# Patient Record
Sex: MALE | Race: WHITE | Employment: OTHER
[De-identification: names, ages, dates, MRNs, and addresses within clinical notes are randomized per-mention and may not be internally consistent; named-entity substitution may affect disease eponyms.]

---

## 2017-02-27 ENCOUNTER — OFFICE VISIT (OUTPATIENT)
Dept: NEUROSURGERY | Facility: CLINIC | Age: 37
End: 2017-02-27

## 2017-02-27 VITALS
SYSTOLIC BLOOD PRESSURE: 165 MMHG | WEIGHT: 315 LBS | HEART RATE: 84 BPM | BODY MASS INDEX: 41.75 KG/M2 | DIASTOLIC BLOOD PRESSURE: 94 MMHG | HEIGHT: 73 IN

## 2017-02-27 DIAGNOSIS — G56.03 BILATERAL CARPAL TUNNEL SYNDROME: ICD-10-CM

## 2017-02-27 DIAGNOSIS — M50.10 CERVICAL DISC DISORDER WITH RADICULOPATHY OF CERVICAL REGION: ICD-10-CM

## 2017-02-27 DIAGNOSIS — G89.29 NECK PAIN, CHRONIC: ICD-10-CM

## 2017-02-27 DIAGNOSIS — M54.2 NECK PAIN, CHRONIC: ICD-10-CM

## 2017-02-27 DIAGNOSIS — E66.01 MORBID OBESITY DUE TO EXCESS CALORIES (HCC): Primary | ICD-10-CM

## 2017-02-27 PROCEDURE — 99213 OFFICE O/P EST LOW 20 MIN: CPT | Performed by: NEUROLOGICAL SURGERY

## 2017-02-27 RX ORDER — CHOLECALCIFEROL (VITAMIN D3) 125 MCG
500 CAPSULE ORAL DAILY
COMMUNITY

## 2017-02-27 RX ORDER — M-VIT,TX,IRON,MINS/CALC/FOLIC 27MG-0.4MG
1 TABLET ORAL DAILY
COMMUNITY
End: 2017-09-06 | Stop reason: ALTCHOICE

## 2017-03-07 ENCOUNTER — APPOINTMENT (OUTPATIENT)
Dept: GENERAL RADIOLOGY | Age: 37
End: 2017-03-07
Payer: OTHER GOVERNMENT

## 2017-03-07 ENCOUNTER — HOSPITAL ENCOUNTER (EMERGENCY)
Age: 37
Discharge: HOME | End: 2017-03-07
Attending: EMERGENCY MEDICINE
Payer: OTHER GOVERNMENT

## 2017-03-07 VITALS
DIASTOLIC BLOOD PRESSURE: 98 MMHG | TEMPERATURE: 98.4 F | BODY MASS INDEX: 47.5 KG/M2 | WEIGHT: 315 LBS | HEART RATE: 96 BPM | RESPIRATION RATE: 16 BRPM | SYSTOLIC BLOOD PRESSURE: 157 MMHG | OXYGEN SATURATION: 96 %

## 2017-03-07 DIAGNOSIS — H81.10 BENIGN PAROXYSMAL POSITIONAL VERTIGO, UNSPECIFIED LATERALITY: Primary | ICD-10-CM

## 2017-03-07 DIAGNOSIS — R07.9 CHEST PAIN, UNSPECIFIED TYPE: ICD-10-CM

## 2017-03-07 LAB
ABSOLUTE EOS #: 0.2 K/UL (ref 0–0.4)
ABSOLUTE LYMPH #: 1.9 K/UL (ref 1–4.8)
ABSOLUTE MONO #: 0.6 K/UL (ref 0.1–1.2)
ANION GAP SERPL CALCULATED.3IONS-SCNC: 14 MMOL/L (ref 9–17)
BASOPHILS # BLD: 0 % (ref 0–2)
BASOPHILS ABSOLUTE: 0 K/UL (ref 0–0.2)
BUN BLDV-MCNC: 14 MG/DL (ref 6–20)
BUN/CREAT BLD: ABNORMAL (ref 9–20)
CALCIUM SERPL-MCNC: 8.4 MG/DL (ref 8.6–10.4)
CHLORIDE BLD-SCNC: 100 MMOL/L (ref 98–107)
CHP ED QC CHECK: NORMAL
CO2: 23 MMOL/L (ref 20–31)
CREAT SERPL-MCNC: 0.94 MG/DL (ref 0.7–1.2)
DIFFERENTIAL TYPE: ABNORMAL
EOSINOPHILS RELATIVE PERCENT: 2 % (ref 1–4)
GFR AFRICAN AMERICAN: >60 ML/MIN
GFR NON-AFRICAN AMERICAN: >60 ML/MIN
GFR SERPL CREATININE-BSD FRML MDRD: ABNORMAL ML/MIN/{1.73_M2}
GFR SERPL CREATININE-BSD FRML MDRD: ABNORMAL ML/MIN/{1.73_M2}
GLUCOSE BLD-MCNC: 251 MG/DL
GLUCOSE BLD-MCNC: 254 MG/DL (ref 70–99)
HCT VFR BLD CALC: 41.3 % (ref 41–53)
HEMOGLOBIN: 14.3 G/DL (ref 13.5–17.5)
LYMPHOCYTES # BLD: 21 % (ref 24–44)
MCH RBC QN AUTO: 29.1 PG (ref 26–34)
MCHC RBC AUTO-ENTMCNC: 34.5 G/DL (ref 31–37)
MCV RBC AUTO: 84.3 FL (ref 80–100)
MONOCYTES # BLD: 6 % (ref 2–11)
PDW BLD-RTO: 14.8 % (ref 12.5–15.4)
PLATELET # BLD: 196 K/UL (ref 140–450)
PLATELET ESTIMATE: ABNORMAL
PMV BLD AUTO: 8.9 FL (ref 6–12)
POC TROPONIN I: 0 NG/ML (ref 0–0.1)
POC TROPONIN I: 0 NG/ML (ref 0–0.1)
POC TROPONIN INTERP: NORMAL
POC TROPONIN INTERP: NORMAL
POTASSIUM SERPL-SCNC: 4 MMOL/L (ref 3.7–5.3)
RBC # BLD: 4.9 M/UL (ref 4.5–5.9)
RBC # BLD: ABNORMAL 10*6/UL
SEG NEUTROPHILS: 71 % (ref 36–66)
SEGMENTED NEUTROPHILS ABSOLUTE COUNT: 6.4 K/UL (ref 1.8–7.7)
SODIUM BLD-SCNC: 137 MMOL/L (ref 135–144)
TSH SERPL DL<=0.05 MIU/L-ACNC: 3.03 MIU/L (ref 0.3–5)
WBC # BLD: 9 K/UL (ref 3.5–11)
WBC # BLD: ABNORMAL 10*3/UL

## 2017-03-07 PROCEDURE — 84443 ASSAY THYROID STIM HORMONE: CPT

## 2017-03-07 PROCEDURE — 80048 BASIC METABOLIC PNL TOTAL CA: CPT

## 2017-03-07 PROCEDURE — 85025 COMPLETE CBC W/AUTO DIFF WBC: CPT

## 2017-03-07 PROCEDURE — 93005 ELECTROCARDIOGRAM TRACING: CPT

## 2017-03-07 PROCEDURE — 6370000000 HC RX 637 (ALT 250 FOR IP): Performed by: EMERGENCY MEDICINE

## 2017-03-07 PROCEDURE — 99285 EMERGENCY DEPT VISIT HI MDM: CPT

## 2017-03-07 PROCEDURE — 71020 XR CHEST STANDARD TWO VW: CPT

## 2017-03-07 PROCEDURE — 2580000003 HC RX 258: Performed by: EMERGENCY MEDICINE

## 2017-03-07 PROCEDURE — 84484 ASSAY OF TROPONIN QUANT: CPT

## 2017-03-07 RX ORDER — ASPIRIN 81 MG/1
324 TABLET, CHEWABLE ORAL ONCE
Status: COMPLETED | OUTPATIENT
Start: 2017-03-07 | End: 2017-03-07

## 2017-03-07 RX ORDER — MECLIZINE HYDROCHLORIDE 25 MG/1
25 TABLET ORAL 3 TIMES DAILY PRN
Qty: 15 TABLET | Refills: 0 | Status: SHIPPED | OUTPATIENT
Start: 2017-03-07 | End: 2017-04-11 | Stop reason: DRUGHIGH

## 2017-03-07 RX ORDER — 0.9 % SODIUM CHLORIDE 0.9 %
1000 INTRAVENOUS SOLUTION INTRAVENOUS ONCE
Status: COMPLETED | OUTPATIENT
Start: 2017-03-07 | End: 2017-03-07

## 2017-03-07 RX ORDER — MECLIZINE HCL 12.5 MG/1
25 TABLET ORAL ONCE
Status: COMPLETED | OUTPATIENT
Start: 2017-03-07 | End: 2017-03-07

## 2017-03-07 RX ADMIN — ASPIRIN 81 MG 324 MG: 81 TABLET ORAL at 15:00

## 2017-03-07 RX ADMIN — SODIUM CHLORIDE 1000 ML: 9 INJECTION, SOLUTION INTRAVENOUS at 15:00

## 2017-03-07 RX ADMIN — MECLIZINE HCL 25 MG: 12.5 TABLET ORAL at 15:12

## 2017-03-07 ASSESSMENT — ENCOUNTER SYMPTOMS
DIARRHEA: 0
VOMITING: 0
BACK PAIN: 0
NAUSEA: 1
CONSTIPATION: 0
RHINORRHEA: 0
TROUBLE SWALLOWING: 0
ABDOMINAL PAIN: 0
SHORTNESS OF BREATH: 1

## 2017-03-07 ASSESSMENT — PAIN DESCRIPTION - PAIN TYPE: TYPE: ACUTE PAIN

## 2017-03-07 ASSESSMENT — PAIN DESCRIPTION - LOCATION: LOCATION: CHEST

## 2017-03-08 LAB
EKG ATRIAL RATE: 89 BPM
EKG P AXIS: 29 DEGREES
EKG P-R INTERVAL: 164 MS
EKG Q-T INTERVAL: 344 MS
EKG QRS DURATION: 82 MS
EKG QTC CALCULATION (BAZETT): 418 MS
EKG R AXIS: 0 DEGREES
EKG T AXIS: 33 DEGREES
EKG VENTRICULAR RATE: 89 BPM

## 2017-04-11 ENCOUNTER — OFFICE VISIT (OUTPATIENT)
Dept: FAMILY MEDICINE CLINIC | Age: 37
End: 2017-04-11
Payer: OTHER GOVERNMENT

## 2017-04-11 VITALS
RESPIRATION RATE: 16 BRPM | OXYGEN SATURATION: 97 % | BODY MASS INDEX: 41.75 KG/M2 | TEMPERATURE: 99 F | HEIGHT: 73 IN | DIASTOLIC BLOOD PRESSURE: 92 MMHG | SYSTOLIC BLOOD PRESSURE: 112 MMHG | HEART RATE: 114 BPM | WEIGHT: 315 LBS

## 2017-04-11 DIAGNOSIS — R06.02 SHORTNESS OF BREATH: ICD-10-CM

## 2017-04-11 DIAGNOSIS — B37.9 YEAST INFECTION: ICD-10-CM

## 2017-04-11 DIAGNOSIS — E07.9 THYROID DISEASE: ICD-10-CM

## 2017-04-11 DIAGNOSIS — E11.8 TYPE 2 DIABETES MELLITUS WITH COMPLICATION, WITHOUT LONG-TERM CURRENT USE OF INSULIN (HCC): Primary | ICD-10-CM

## 2017-04-11 DIAGNOSIS — N39.490 OVERFLOW INCONTINENCE: ICD-10-CM

## 2017-04-11 LAB
-: ABNORMAL
AMORPHOUS: ABNORMAL
BACTERIA: ABNORMAL
CASTS UA: ABNORMAL /LPF (ref 0–2)
CRYSTALS, UA: ABNORMAL /HPF
EPITHELIAL CELLS UA: ABNORMAL /HPF (ref 0–5)
GLUCOSE BLD-MCNC: 302 MG/DL
MUCUS: ABNORMAL
OTHER OBSERVATIONS UA: ABNORMAL
RBC UA: ABNORMAL /HPF (ref 0–4)
RENAL EPITHELIAL, UA: ABNORMAL /HPF
TRICHOMONAS: ABNORMAL
WBC UA: ABNORMAL /HPF (ref 0–4)
YEAST: ABNORMAL

## 2017-04-11 PROCEDURE — 99214 OFFICE O/P EST MOD 30 MIN: CPT | Performed by: FAMILY MEDICINE

## 2017-04-11 PROCEDURE — 93000 ELECTROCARDIOGRAM COMPLETE: CPT | Performed by: FAMILY MEDICINE

## 2017-04-11 PROCEDURE — 82962 GLUCOSE BLOOD TEST: CPT | Performed by: FAMILY MEDICINE

## 2017-04-11 RX ORDER — GLIMEPIRIDE 2 MG/1
2 TABLET ORAL EVERY MORNING
Qty: 30 TABLET | Refills: 3 | Status: SHIPPED | OUTPATIENT
Start: 2017-04-11 | End: 2017-04-11 | Stop reason: SDUPTHER

## 2017-04-11 RX ORDER — HYDROCODONE BITARTRATE AND ACETAMINOPHEN 5; 325 MG/1; MG/1
1 TABLET ORAL EVERY 4 HOURS PRN
Refills: 0 | COMMUNITY
Start: 2017-04-05 | End: 2017-04-11

## 2017-04-11 RX ORDER — LEVOTHYROXINE SODIUM 0.1 MG/1
0.5 TABLET ORAL DAILY
COMMUNITY

## 2017-04-11 RX ORDER — GLIMEPIRIDE 2 MG/1
2 TABLET ORAL EVERY MORNING
Qty: 30 TABLET | Refills: 3 | Status: SHIPPED | OUTPATIENT
Start: 2017-04-11 | End: 2017-05-11 | Stop reason: ALTCHOICE

## 2017-04-11 RX ORDER — NYSTATIN 100000 U/G
OINTMENT TOPICAL
Qty: 30 G | Refills: 0 | Status: SHIPPED | OUTPATIENT
Start: 2017-04-11 | End: 2017-05-11 | Stop reason: ALTCHOICE

## 2017-04-11 ASSESSMENT — ENCOUNTER SYMPTOMS
WHEEZING: 0
SHORTNESS OF BREATH: 1
CHEST TIGHTNESS: 0
ABDOMINAL PAIN: 0
NAUSEA: 1
COUGH: 0

## 2017-04-12 ENCOUNTER — TELEPHONE (OUTPATIENT)
Dept: FAMILY MEDICINE CLINIC | Age: 37
End: 2017-04-12

## 2017-04-20 ENCOUNTER — OFFICE VISIT (OUTPATIENT)
Dept: NEUROLOGY | Age: 37
End: 2017-04-20
Payer: OTHER GOVERNMENT

## 2017-04-20 VITALS
HEIGHT: 73 IN | HEART RATE: 70 BPM | DIASTOLIC BLOOD PRESSURE: 82 MMHG | BODY MASS INDEX: 41.75 KG/M2 | WEIGHT: 315 LBS | SYSTOLIC BLOOD PRESSURE: 124 MMHG

## 2017-04-20 DIAGNOSIS — R41.0 CONFUSION: ICD-10-CM

## 2017-04-20 DIAGNOSIS — F41.9 ANXIETY: ICD-10-CM

## 2017-04-20 DIAGNOSIS — M19.90 ARTHRITIS: ICD-10-CM

## 2017-04-20 DIAGNOSIS — G47.9 SLEEP DIFFICULTIES: ICD-10-CM

## 2017-04-20 DIAGNOSIS — E66.01 MORBID OBESITY DUE TO EXCESS CALORIES (HCC): ICD-10-CM

## 2017-04-20 DIAGNOSIS — R26.9 GAIT DIFFICULTY: ICD-10-CM

## 2017-04-20 DIAGNOSIS — S06.9X9S TBI (TRAUMATIC BRAIN INJURY), WITH LOSS OF CONSCIOUSNESS OF UNSPECIFIED DURATION, SEQUELA: ICD-10-CM

## 2017-04-20 DIAGNOSIS — G44.309 POST-TRAUMATIC HEADACHE, NOT INTRACTABLE, UNSPECIFIED CHRONICITY PATTERN: ICD-10-CM

## 2017-04-20 DIAGNOSIS — E11.8 TYPE 2 DIABETES MELLITUS WITH COMPLICATION, WITHOUT LONG-TERM CURRENT USE OF INSULIN (HCC): ICD-10-CM

## 2017-04-20 DIAGNOSIS — G56.01 CARPAL TUNNEL SYNDROME, RIGHT: ICD-10-CM

## 2017-04-20 DIAGNOSIS — K21.9 GASTROESOPHAGEAL REFLUX DISEASE WITHOUT ESOPHAGITIS: ICD-10-CM

## 2017-04-20 DIAGNOSIS — R26.89 BALANCE PROBLEM: ICD-10-CM

## 2017-04-20 DIAGNOSIS — G62.9 PERIPHERAL POLYNEUROPATHY: ICD-10-CM

## 2017-04-20 DIAGNOSIS — G89.29 CHRONIC PAIN OF LEFT ANKLE: ICD-10-CM

## 2017-04-20 DIAGNOSIS — G47.30 SLEEP APNEA, UNSPECIFIED TYPE: ICD-10-CM

## 2017-04-20 DIAGNOSIS — F32.A DEPRESSION, UNSPECIFIED DEPRESSION TYPE: ICD-10-CM

## 2017-04-20 DIAGNOSIS — G40.909 SEIZURE DISORDER (HCC): Primary | ICD-10-CM

## 2017-04-20 DIAGNOSIS — E07.9 THYROID DISEASE: ICD-10-CM

## 2017-04-20 DIAGNOSIS — M25.572 CHRONIC PAIN OF LEFT ANKLE: ICD-10-CM

## 2017-04-20 DIAGNOSIS — R56.9 CONVULSIONS, UNSPECIFIED CONVULSION TYPE (HCC): ICD-10-CM

## 2017-04-20 DIAGNOSIS — R41.3 MEMORY PROBLEM: ICD-10-CM

## 2017-04-20 DIAGNOSIS — Z87.828 OLD HEAD INJURY: ICD-10-CM

## 2017-04-20 PROCEDURE — 99215 OFFICE O/P EST HI 40 MIN: CPT | Performed by: PSYCHIATRY & NEUROLOGY

## 2017-04-20 ASSESSMENT — ENCOUNTER SYMPTOMS
APNEA: 0
BLOOD IN STOOL: 0
ABDOMINAL DISTENTION: 0
DIARRHEA: 0
EYE REDNESS: 0
VOICE CHANGE: 0
TROUBLE SWALLOWING: 0
CHEST TIGHTNESS: 0
WHEEZING: 0
EYE ITCHING: 0
SHORTNESS OF BREATH: 0
CONSTIPATION: 0
CHOKING: 0
EYE DISCHARGE: 0
SINUS PRESSURE: 0
EYE PAIN: 0
FACIAL SWELLING: 0
PHOTOPHOBIA: 0
BACK PAIN: 1
COLOR CHANGE: 0

## 2017-04-25 ENCOUNTER — TELEPHONE (OUTPATIENT)
Dept: FAMILY MEDICINE CLINIC | Age: 37
End: 2017-04-25

## 2017-05-08 ENCOUNTER — TELEPHONE (OUTPATIENT)
Dept: FAMILY MEDICINE CLINIC | Age: 37
End: 2017-05-08

## 2017-05-11 ENCOUNTER — OFFICE VISIT (OUTPATIENT)
Dept: FAMILY MEDICINE CLINIC | Age: 37
End: 2017-05-11
Payer: OTHER GOVERNMENT

## 2017-05-11 VITALS
WEIGHT: 315 LBS | TEMPERATURE: 97.6 F | BODY MASS INDEX: 41.75 KG/M2 | HEIGHT: 73 IN | SYSTOLIC BLOOD PRESSURE: 130 MMHG | HEART RATE: 84 BPM | DIASTOLIC BLOOD PRESSURE: 82 MMHG | OXYGEN SATURATION: 98 %

## 2017-05-11 DIAGNOSIS — E66.01 MORBID OBESITY DUE TO EXCESS CALORIES (HCC): ICD-10-CM

## 2017-05-11 DIAGNOSIS — E11.9 TYPE 2 DIABETES MELLITUS WITHOUT COMPLICATION, WITHOUT LONG-TERM CURRENT USE OF INSULIN (HCC): Primary | ICD-10-CM

## 2017-05-11 PROCEDURE — 99213 OFFICE O/P EST LOW 20 MIN: CPT | Performed by: FAMILY MEDICINE

## 2017-05-11 RX ORDER — GLIPIZIDE 5 MG/1
5 TABLET ORAL
COMMUNITY
End: 2017-09-06 | Stop reason: ALTCHOICE

## 2017-05-11 ASSESSMENT — ENCOUNTER SYMPTOMS
CHEST TIGHTNESS: 0
SHORTNESS OF BREATH: 0
WHEEZING: 0
COUGH: 0

## 2017-05-17 ENCOUNTER — HOSPITAL ENCOUNTER (OUTPATIENT)
Age: 37
Setting detail: SPECIMEN
Discharge: HOME | End: 2017-05-17
Payer: COMMERCIAL

## 2017-09-06 ENCOUNTER — OFFICE VISIT (OUTPATIENT)
Dept: PODIATRY | Age: 37
End: 2017-09-06
Payer: OTHER GOVERNMENT

## 2017-09-06 VITALS
DIASTOLIC BLOOD PRESSURE: 78 MMHG | SYSTOLIC BLOOD PRESSURE: 124 MMHG | WEIGHT: 315 LBS | HEART RATE: 84 BPM | RESPIRATION RATE: 20 BRPM | HEIGHT: 73 IN | BODY MASS INDEX: 41.75 KG/M2

## 2017-09-06 DIAGNOSIS — G89.29 CHRONIC PAIN OF LEFT ANKLE: ICD-10-CM

## 2017-09-06 DIAGNOSIS — M21.6X2 PRONATION OF BOTH FEET: ICD-10-CM

## 2017-09-06 DIAGNOSIS — M25.572 CHRONIC PAIN OF LEFT ANKLE: ICD-10-CM

## 2017-09-06 DIAGNOSIS — E11.49 DM TYPE 2 CAUSING NEUROLOGICAL DISEASE (HCC): Primary | ICD-10-CM

## 2017-09-06 DIAGNOSIS — M21.6X1 PRONATION OF BOTH FEET: ICD-10-CM

## 2017-09-06 PROCEDURE — 99213 OFFICE O/P EST LOW 20 MIN: CPT | Performed by: PODIATRIST

## 2022-07-15 ENCOUNTER — OFFICE VISIT (OUTPATIENT)
Dept: NEUROSURGERY | Age: 42
End: 2022-07-15
Payer: OTHER GOVERNMENT

## 2022-07-15 VITALS
HEIGHT: 73 IN | DIASTOLIC BLOOD PRESSURE: 72 MMHG | WEIGHT: 308 LBS | SYSTOLIC BLOOD PRESSURE: 118 MMHG | HEART RATE: 94 BPM | BODY MASS INDEX: 40.82 KG/M2

## 2022-07-15 DIAGNOSIS — M47.818 ARTHROPATHY OF LEFT SACROILIAC JOINT: Primary | ICD-10-CM

## 2022-07-15 PROCEDURE — 99204 OFFICE O/P NEW MOD 45 MIN: CPT | Performed by: NEUROLOGICAL SURGERY

## 2022-07-15 RX ORDER — NORTRIPTYLINE HYDROCHLORIDE 25 MG/1
25 CAPSULE ORAL
COMMUNITY
Start: 2022-05-19

## 2022-07-15 NOTE — PROGRESS NOTES
[place] [address] [facility]  [address] [facility]  [address] [facility], [address]  [place] [address] [place] [address]  Dept: [phone]    Patient:  Toby Durán  YOB: 1980  Date: 7/15/22    The patient is a 43 y.o. male who presents today for consult of the following problems:     Chief Complaint   Patient presents with    Back Pain     Low back pain, mri done in April 2022 at [address] [facility]             HPI:     Toby Durán is a 43 y.o. male on whom neurosurgical consultation was requested by No primary care provider on file. for management of left-sided paraspinal and left lower flank pain that radiates along the lateral aspect of the flank into the groin and the medial aspect of the left thigh terminating at the knee on the medial region. Denies any significant pain below the knee into the calf region shin or into the foot. Denies any pain directly over the hip joint. Some correlation with change in position as well as ambulating and weightbearing on the left side. No significant correlating symptoms on the right. Has had ongoing axial pain for a number of years as he is a  and has undergone significant months of trauma. The pain radiating into the left lower extremity along the flank has been more recent just as of February without any specific inciting event. He specifically states that he awoke with the pain 1 day and did not recall any type of inciting event that occurred prior to this. Has been through chiropractic interventions and dry needling which actually made him somewhat worse. Has not had any previous steroid injections thus far. No medications for pain and using ice currently.       History:     Past Medical History:   Diagnosis Date    Anesthesia     trouble coming out of anesthesia and with morphine    Arthritis     Carpal tunnel syndrome, right     Diabetes mellitus (HCC)     Dizziness     GERD Review of Systems  ROS: back and leg pain    Physical Exam:      /72   Pulse 94   Ht 6' 1\" (1.854 m)   BMI 48.42 kg/m²   Estimated body mass index is 48.42 kg/m² as calculated from the following:    Height as of this encounter: 6' 1\" (1.854 m). Weight as of 9/6/17: 367 lb (166.5 kg). General:  Urszula Ortiz is a 43y.o. year old male who appears his stated age. HEENT: Normocephalic atraumatic. Neck supple. Chest: regular rate; pulses equal. Equal chest rise and fall  Abdomen: Soft nondistended. Ext: DP equal with good capillary refill  Neuro    Mentation  Appropriate affect   oriented    Cranial Nerves:   Pupils equal and reactive to light  Extraocular motion intact  Face symmetric  No dysarthria  v1-3 sensation symmetric, masseter tone symmetric  Hearing symmetric and intact to finger rub    Sensation:   intact    Motor  L deltoid 5/5; R deltoid 5/5  L biceps 5/5; R biceps 5/5  L triceps 5/5; R triceps 5/5  L wrist extension 5/5; R wrist extension 5/5  L intrinsics 5/5; R intrinsics 5/5     L iliopsoas 5/5 , R iliopsoas 5/5  L quadriceps 5/5; R quadriceps 5/5  L Dorsiflexion 5/5; R dorsiflexion 5/5  L Plantarflexion 5/5; R plantarflexion 5/5  L EHL 5/5; R EHL 5/5    Reflexes  L Brachioradialis 2+/4; R brachioradialis 2+/4  L Biceps 2+/4; R Biceps 2+/4  L Triceps 2+/4; R Triceps 2+/4  L Patellar 2+/4: R Patellar 2+/4  L Achilles 2+/4; R Achilles 2+/4    hoffmans L: neg  hoffmans R: neg  Clonus L: neg  Clonus R: neg  Babinski L: up  Babinski R; up    +pain with external rotation. +TTP over L SIJ  Neg tenderness over greater trochanters. Neg straight leg raise    Studies Review:     MRI lumbar spine with left-sided paracentral disc protrusion at L4-5 along with disc desiccation. Overall maintained lordosis with mild multilevel spondylosis. Assessment and Plan:      1.  Arthropathy of left sacroiliac joint          Plan: Clinically appears that the patient's pattern does not seem consistent necessarily with an L5 radiculopathy. He does have focal tenderness over the left SI joint and pain appears to radiate along the flank into the groin. This leads me to believe that this very well may be a focal sacroiliitis that has caused extreme symptoms related to gait changes. Would recommend a diagnostic block of the left SI joint followed by ablation if successful. If this is unsuccessful would consider  L5 either transforaminal or an L4-5 interlaminar. Followup: No follow-ups on file. Prescriptions Ordered:  Orders Placed This Encounter   Medications    diclofenac sodium (VOLTAREN) 1 % GEL     Sig: Apply 4 g topically 4 times daily as needed for Pain     Dispense:  100 g     Refill:  2        Orders Placed:  No orders of the defined types were placed in this encounter. Electronically signed by Leigh Deng DO on 7/15/2022 at 12:06 PM    Please note that this chart was generated using voice recognition Dragon dictation software. Although every effort was made to ensure the accuracy of this automated transcription, some errors in transcription may have occurred.

## 2022-08-04 ENCOUNTER — TELEPHONE (OUTPATIENT)
Dept: PAIN MANAGEMENT | Age: 42
End: 2022-08-04

## 2022-08-04 NOTE — TELEPHONE ENCOUNTER
Dr. Carmen Lara sent me  message  while I was away  to perform   Left S-I joint injection. Please  schedule  new OV  and let patient  know that Dr. Carmen Lara asked for me  to  see him.  Thank You

## 2022-08-15 ENCOUNTER — OFFICE VISIT (OUTPATIENT)
Dept: PAIN MANAGEMENT | Age: 42
End: 2022-08-15
Payer: OTHER GOVERNMENT

## 2022-08-15 ENCOUNTER — TELEPHONE (OUTPATIENT)
Dept: PAIN MANAGEMENT | Age: 42
End: 2022-08-15

## 2022-08-15 VITALS
RESPIRATION RATE: 17 BRPM | BODY MASS INDEX: 39.63 KG/M2 | DIASTOLIC BLOOD PRESSURE: 84 MMHG | SYSTOLIC BLOOD PRESSURE: 120 MMHG | OXYGEN SATURATION: 96 % | HEART RATE: 93 BPM | HEIGHT: 73 IN | WEIGHT: 299 LBS

## 2022-08-15 DIAGNOSIS — M46.1 SACROILIITIS (HCC): Primary | ICD-10-CM

## 2022-08-15 DIAGNOSIS — M51.26 PROTRUSION OF LUMBAR INTERVERTEBRAL DISC: ICD-10-CM

## 2022-08-15 PROCEDURE — 99215 OFFICE O/P EST HI 40 MIN: CPT | Performed by: PHYSICAL MEDICINE & REHABILITATION

## 2022-08-15 PROCEDURE — 99204 OFFICE O/P NEW MOD 45 MIN: CPT | Performed by: PHYSICAL MEDICINE & REHABILITATION

## 2022-08-15 ASSESSMENT — ENCOUNTER SYMPTOMS
RESPIRATORY NEGATIVE: 1
ALLERGIC/IMMUNOLOGIC NEGATIVE: 1
NAUSEA: 0
CONSTIPATION: 0
EYES NEGATIVE: 1
BACK PAIN: 1

## 2022-08-15 NOTE — PATIENT INSTRUCTIONS
[facility]  [address]  Telephone [phone]  Fax [phone]      PROCEDURE INSTRUCTIONS FOR  PAIN MANAGEMENT PROCEDURES WITHOUT IV SEDATION    Nelda Torres scheduled to see Dr. Ifeanyi Herrera to undergo the following procedure:  Left Sacroliliac Joint Injection    Procedure Date: ____8/26/22____  You will receive a phone call the day prior to your procedure to confirm a time of arrival.    Report to the [place] [id number], Registration office on the 1st floor in the hospital, after check in and signing of paper work you will then go to the second floor to the surgery center. 1. Stop the following medications prior to the procedure:    none  Stop blood thinners as directed before the injection, with permission from your cardiologist or primary care physician. We will send a letter to them requesting permission to hold the blood thinners. If you take Warfarin (Coumadin), you must have your blood drawn for an INR the day before the procedure. INR must be less than 1.5. if not complete prior to check in the procedure this will be drawn at the procedure center prior to having the procedure completed. 2.  Take all routine medications unless otherwise instructed. Ok to take vitamins and antiinflammatory medications    3. EATING & DRINKING:  Ok to eat or drink before the injection - no IV sedation will be used. 4. If you are allergic to contrast or iodine, you must take benadryl and prednisone prior to the injection to prevent an allergic reaction. Follow the directions on the prescription for the times to take the medication. 5. Oral valium can be prescribed if your are anxious about the injections or feel that you can not lay still during the injection. If you take valium, you must have a . Make arrangements for a family member or friend to drive you to the surgery center.   Your ride must stay in the hospital while you are having the injection done. If they cannot stay, the injection will be rescheduled. The valium may affect your judgment following the procedure and driving a vehicle within 24 hours after the sedation could be dangerous. 6.  Wear simple loose clothing, which can be easily changed. 7.  Leave jewelry (including rings) and other valuables at home. 8.  You will be asked to sign several forms prior to surgery; patients under the age of 25 must have a parent or legal guardian sign the permit to be able to do the procedure. 9.  You must have finished any antibiotic prescribed for recent infections. If required, please take pre-procedure antibiotic or other pre-procedure medications as instructed. 10. Bring inhalers and pain medications with you to your procedure. 11. Bring your MRI/CT films if they were done outside of the [facility]. 12. If you should develop a cold, sore throat, cough, fever or other new indication of illness or infection, or are started on antibiotics within 2 weeks of the scheduled procedure, please notify the [facility] office as early as possible at [phone]. If calling after 4:30pm the day prior to your scheduled procedure please contact [phone] and Leave a Voice Message.

## 2022-08-15 NOTE — PROGRESS NOTES
PAIN MANAGEMENTNEW PATIENT CONSULTATION  8/15/22    Adonica Paz  1980    ReferringProvider:  Estelle Faust MD  [id number],  [address] [place]    Primary Care Physician:  No primary care provider on file. No primary provider on file. HPIinformation obtained from the patient as well as the Comprehensive Pain ManagementHealth Questionnaire filled out by the patient. The questionnaire will be scannedinto the electronic chart and PMH, PSH, meds, and allergies will be updates accordingly. Subjective:       CHIEF COMPLAINT:  This is a37 y.o. male patientwho presents with a complaint of Back Pain (Lower ) and Hip Pain (Both )      PAIN HPI:    Pain in L lumbar  radiates to posterior lateral L leg to knee and  also along groin into medial  leg Has  point pain L mid gluteal     Location: Back L eft   Location Modifier: Left  Severity of Pain: 5  Duration of Pain: Intermittent  Frequency of Pain: Intermittent  Aggravating Factors: Stretching, Bending, Straightening, Standing, Walking, Stairs, Exercise, Kneeling, and Squatting  Limiting Behavior: no  Relieving Factors: Heat, Cold, and Medication -  Result of Injury: no  Work Related Injury: no  [place] of Worker Compensation Case: no      Prior evaluation:    Previous lower back problems:No    Previous workup: No   History of surgery in painful area:No    Previous pain medication trials have included:       Opioids: -     NSAID's:-     Muscle relaxants: -     Neuropathicpain meds: -    Previous Pain Management Physician: No   Nerve blocks, spinal injections:No   Typeof Pain Intervention -. Date of last Pain Intervention  .-   Was there pain relief from Pain Intervention: No.    How long was your pain relief from the Pain Intervention .-    Sleep:       Difficultyfalling asleep:  No   Takes sleepingmedication: No    Mental health:    Patient feels - secondary to their current pain problems as described above.    H/O depression and anxiety: No   Patient is not seeing psychologist orpsychiatrist   Abuse history? .    Employed? No  Alcohol use?: No  Tobacco use?: No  Marijuana use?: No  Illicit drug use?: No    Imaging: Reviewed available imagingin our system with the patient. No results found. Referring physician records reviewed. Review of Systems   Constitutional:  Positive for fatigue. HENT: Negative. Eyes: Negative. Respiratory: Negative. Cardiovascular: Negative. Gastrointestinal:  Negative for constipation and nausea. Endocrine: Negative. Genitourinary:  Negative for difficulty urinating. Musculoskeletal:  Positive for arthralgias, back pain and myalgias. Skin: Negative. Allergic/Immunologic: Negative. Neurological:  Positive for weakness and numbness. Hematological: Negative. Psychiatric/Behavioral:  Positive for sleep disturbance. All other systems reviewed and are negative. Allergies   Allergen Reactions    Advil [Ibuprofen]     Diclofenac Other (See Comments) and Diarrhea     Other reaction(s): Abdominal pain, Diarrhea, Diarrhea, Abdominal pain, pain  Abdominal pains and muscle spasms         Outpatient Medications Prior to Visit   Medication Sig Dispense Refill    nortriptyline (PAMELOR) 25 MG capsule Take 25 mg by mouth      diclofenac sodium (VOLTAREN) 1 % GEL Apply 4 g topically 4 times daily as needed for Pain 100 g 2    levothyroxine (SYNTHROID) 100 MCG tablet Take 0.5 tablets by mouth daily      vitamin B-12 (CYANOCOBALAMIN) 500 MCG tablet Take 500 mcg by mouth daily      vitamin D (ERGOCALCIFEROL) 68193 UNITS CAPS capsule Take 50,000 Units by mouth once a week       No facility-administered medications prior to visit.        Past Medical History:   Diagnosis Date    Anesthesia     trouble coming out of anesthesia and with morphine    Arthritis     Carpal tunnel syndrome, right     Diabetes mellitus (HCC)     Dizziness     GERD (gastroesophageal reflux disease)     no RX    Headache     IBS (irritable bowel syndrome)     PTSD (post-traumatic stress disorder)     [employer]     Shortness of breath     Sleep apnea     cpap    Thyroid disease     Wrist pain, right        Past Surgical History:   Procedure Laterality Date    CARPAL TUNNEL RELEASE Right 10/20/2016    CYST REMOVAL Left 2009    testicle    FOOT SURGERY  2007    SHOULDER SURGERY Right 04/2017    [facility] / [facility]    TESTICLE REMOVAL Left 2010    VASECTOMY  2010       Family History   Problem Relation Age of Onset    Heart Disease Father     High Blood Pressure Father     Stroke Father     Bleeding Prob Father     Kidney Disease Father     Thyroid Disease Father     Alcohol Abuse Father     Other Father         respiratory illness    Diabetes Maternal Grandmother     Diabetes Maternal Grandfather     Breast Cancer Paternal Grandmother     Diabetes Paternal Grandmother     Diabetes Paternal Grandfather     Glaucoma Other      Social History     Socioeconomic History    Marital status:      Spouse name: None    Number of children: None    Years of education: None    Highest education level: None   Tobacco Use    Smoking status: Former     Years: 7.00     Types: Cigarettes    Smokeless tobacco: Never   Substance and Sexual Activity    Alcohol use: No    Drug use: Yes     Types: Marijuana (Weed)    Sexual activity: Yes     Partners: Female         Objective:     Physical Exam:  Vitals:    08/15/22 1418   BP: 120/84   Pulse: 93   Resp: 17   SpO2: 96%   Weight: 299 lb (135.6 kg)   Height: 6' 1\" (1.854 m)          Physical Exam  Constitutional:       Appearance: He is well-developed. HENT:      Head: Normocephalic and atraumatic. Cardiovascular:      Pulses: Normal pulses. Comments: Warm extremities. Normal capillary refill. Pulmonary:      Effort: Pulmonary effort is normal.   Abdominal:      General: Abdomen is flat. Palpations: Abdomen is soft. Skin:     General: Skin is warm and dry.    Neurological:      Mental Status: He is alert and oriented to person, place, and time. Cranial Nerves: No cranial nerve deficit. Sensory: No sensory deficit. Motor: No atrophy or abnormal muscle tone. Deep Tendon Reflexes: Reflexes are normal and symmetric. Psychiatric:         Speech: Speech normal.         Behavior: Behavior normal.       Back Exam     Tenderness   The patient is experiencing tenderness in the lumbar (+ fabers  ? ++SLR+Slump  ?). Labs:   Lab Results   Component Value Date    WBC 9.0 03/07/2017    HGB 14.3 03/07/2017    HCT 41.3 03/07/2017     03/07/2017     (H) 04/11/2017    AST 85 (H) 04/11/2017     04/11/2017    K 4.0 04/11/2017    CL 96 (L) 04/11/2017    CREATININE 0.89 04/11/2017    BUN 13 04/11/2017    CO2 24 04/11/2017    TSH 3.02 04/11/2017    LABA1C 9.4 (H) 04/11/2017    LABMICR CANNOT BE CALCULATED 05/11/2017       Assessment: This is a 43 y.o. male with the following diagnosis:     Pain Diagnoses:  1. Sacroiliitis ([facility] [address]    2. Protrusion of lumbar intervertebral disc        Medical/ Psychological Comorbidities:  As listed in the past medical and surgical history    Functional Limitations secondary to the above problems:  Chronic painlimits function and quality of life    Plan:   L S-I injection and follow      Meds:   New Prescriptions    No medications on file      No orders of the defined types were placed in this encounter. Controlled Substances Monitoring:    [place]AR[place] report was reviewed for [place], [place]. Pt educated about the risks of taking opiates, including increasedsedation, constipation, slowed breathing, tolerance, dependence, and addiction. No orders of the defined types were placed in this encounter. No follow-ups on file. The patient expressed understanding of the above assessment and plan.     Totaltime spent face to face with patient was 25 minutes inwhich  50% or more of the time was spent in counseling, education about risk andbenefits of the above plan, and coordination of care.

## 2022-08-15 NOTE — TELEPHONE ENCOUNTER
Left SIJ Injection  with no sedation scheduled for 8/26/22 with surgery center notified. Mayuri Dobbs

## 2022-08-26 ENCOUNTER — HOSPITAL ENCOUNTER (OUTPATIENT)
Age: 42
Setting detail: OUTPATIENT SURGERY
Discharge: HOME | End: 2022-08-26
Attending: PHYSICAL MEDICINE & REHABILITATION | Admitting: PHYSICAL MEDICINE & REHABILITATION
Payer: OTHER GOVERNMENT

## 2022-08-26 ENCOUNTER — APPOINTMENT (OUTPATIENT)
Dept: GENERAL RADIOLOGY | Age: 42
End: 2022-08-26
Attending: PHYSICAL MEDICINE & REHABILITATION
Payer: OTHER GOVERNMENT

## 2022-08-26 VITALS
OXYGEN SATURATION: 99 % | DIASTOLIC BLOOD PRESSURE: 70 MMHG | WEIGHT: 297 LBS | SYSTOLIC BLOOD PRESSURE: 121 MMHG | BODY MASS INDEX: 39.36 KG/M2 | HEART RATE: 75 BPM | RESPIRATION RATE: 16 BRPM | TEMPERATURE: 97 F | HEIGHT: 73 IN

## 2022-08-26 PROBLEM — M46.1 SACROILIITIS (HCC): Status: ACTIVE | Noted: 2022-08-26

## 2022-08-26 PROBLEM — M53.3 SACROILIAC PAIN: Status: ACTIVE | Noted: 2022-08-26

## 2022-08-26 PROCEDURE — 6360000004 HC RX CONTRAST MEDICATION: Performed by: PHYSICAL MEDICINE & REHABILITATION

## 2022-08-26 PROCEDURE — 27096 INJECT SACROILIAC JOINT: CPT | Performed by: PHYSICAL MEDICINE & REHABILITATION

## 2022-08-26 PROCEDURE — 3600000056 HC PAIN LEVEL 4 BASE: Performed by: PHYSICAL MEDICINE & REHABILITATION

## 2022-08-26 PROCEDURE — 6360000002 HC RX W HCPCS: Performed by: PHYSICAL MEDICINE & REHABILITATION

## 2022-08-26 PROCEDURE — 3600000057 HC PAIN LEVEL 4 ADDL 15 MIN: Performed by: PHYSICAL MEDICINE & REHABILITATION

## 2022-08-26 PROCEDURE — 3209999900 FLUORO FOR SURGICAL PROCEDURES

## 2022-08-26 PROCEDURE — 7100000010 HC PHASE II RECOVERY - FIRST 15 MIN: Performed by: PHYSICAL MEDICINE & REHABILITATION

## 2022-08-26 PROCEDURE — 2500000003 HC RX 250 WO HCPCS: Performed by: PHYSICAL MEDICINE & REHABILITATION

## 2022-08-26 PROCEDURE — 2709999900 HC NON-CHARGEABLE SUPPLY: Performed by: PHYSICAL MEDICINE & REHABILITATION

## 2022-08-26 RX ORDER — SODIUM CHLORIDE 0.9 % (FLUSH) 0.9 %
5-40 SYRINGE (ML) INJECTION PRN
Status: CANCELLED | OUTPATIENT
Start: 2022-08-26

## 2022-08-26 RX ORDER — SODIUM CHLORIDE 0.9 % (FLUSH) 0.9 %
5-40 SYRINGE (ML) INJECTION EVERY 12 HOURS SCHEDULED
Status: CANCELLED | OUTPATIENT
Start: 2022-08-26

## 2022-08-26 RX ORDER — SODIUM CHLORIDE 9 MG/ML
INJECTION, SOLUTION INTRAVENOUS PRN
Status: CANCELLED | OUTPATIENT
Start: 2022-08-26

## 2022-08-26 RX ORDER — ROPIVACAINE HYDROCHLORIDE 5 MG/ML
INJECTION, SOLUTION EPIDURAL; INFILTRATION; PERINEURAL PRN
Status: DISCONTINUED | OUTPATIENT
Start: 2022-08-26 | End: 2022-08-26 | Stop reason: ALTCHOICE

## 2022-08-26 RX ORDER — DEXAMETHASONE SODIUM PHOSPHATE 10 MG/ML
INJECTION INTRAMUSCULAR; INTRAVENOUS PRN
Status: DISCONTINUED | OUTPATIENT
Start: 2022-08-26 | End: 2022-08-26 | Stop reason: ALTCHOICE

## 2022-08-26 RX ORDER — LIDOCAINE HYDROCHLORIDE 20 MG/ML
INJECTION, SOLUTION EPIDURAL; INFILTRATION; INTRACAUDAL; PERINEURAL PRN
Status: DISCONTINUED | OUTPATIENT
Start: 2022-08-26 | End: 2022-08-26 | Stop reason: ALTCHOICE

## 2022-08-26 ASSESSMENT — PAIN SCALES - GENERAL: PAINLEVEL_OUTOF10: 6

## 2022-08-26 ASSESSMENT — PAIN - FUNCTIONAL ASSESSMENT: PAIN_FUNCTIONAL_ASSESSMENT: 0-10

## 2022-08-26 ASSESSMENT — ENCOUNTER SYMPTOMS
CONSTIPATION: 0
NAUSEA: 0
ALLERGIC/IMMUNOLOGIC NEGATIVE: 1
BACK PAIN: 1
EYES NEGATIVE: 1
RESPIRATORY NEGATIVE: 1

## 2022-08-26 ASSESSMENT — PAIN DESCRIPTION - PAIN TYPE: TYPE: CHRONIC PAIN

## 2022-08-26 ASSESSMENT — PAIN DESCRIPTION - LOCATION: LOCATION: BACK

## 2022-08-26 ASSESSMENT — PAIN DESCRIPTION - ORIENTATION: ORIENTATION: LOWER

## 2022-08-26 NOTE — OP NOTE
SACROILIAC JOINT INJECTION    8/26/22  The patient was counseled at length about the risks of kimo Covid-19 during their perioperative period and any recovery window from their procedure. The patient was made aware that kimo Covid-19  may worsen their prognosis for recovering from their procedure  and lend to a higher morbidity and/or mortality risk. All material risks, benefits, and reasonable alternatives including postponing the procedure were discussed. The patient does wish to proceed with the procedure at this time. Surgeon: America Briseno MD    Pre-operative Diagnosis:   Hospital Problems             Last Modified POA    * (Principal) Sacroiliitis ([facility] [address] 8/26/2022 Yes    Sacroiliac pain 8/26/2022 Yes       Post-operative Diagnosis: Same    Assistants: none    INDICATION::Please see H&P for details on previous treatments, examination findings, and work up. Left  sacroiliac joint injection with arthrography is requested for diagnostic reasons. Conservative treatment was ineffective i.e.: ice, NSAIDS, rest, narcotic medication, chiropractic care, physical therapy and message therapy. Patient is unable to perform the following ADL's: toileting, personal cares, and ambulating     Pain Assessment: 0-10  Pain Level: 10     Pain Orientation: Left  Pain Location: Back, Hip       Last Plain films: 2021    EXAMINATION: Left sacroiliac joint injection with arthrography. CONSENT:  Written consent was obtained from the patient on preprinted consent form after explaining the procedure, indications, potential complications and outcomes. Alternative treatments were also discussed. DISCUSSION:  The patient was sterilely prepped and draped in the usual fashion in the prone position. Time out was verified for correct patient, side, level and procedure. SEDATION:   No conscious sedation was performed during the procedure. The patient remained awake and conversed throughout the procedure. The patient underwent pulse oximetry and blood pressure monitoring independently by a trained observer, as well as by a physician. PROCEDURES #1 to #3:  Under image-intensifier control, a standard technique was employed, a 22 gauge needle x 5 inch spinal needle was guided successfully to cannulate the Left sacroiliac joint via a posterior lateral approach. Instillation of .5 mL of Omnipaque 240 contrast medium demonstrated contiguous flow into the joint and the joint capsule. No vascular spread was noted. Digital subtraction was not employed to evaluate for vascular spread. The patient was monitored for any untoward reaction to contrast medium before proceeding with procedure #2. Then 3.0 mL of equal volumes of 0.50% ropivacaine, 2% lidocaine and betamethasone (Celestone 6 mg/mL), dexamethasone (Decadron 10 mg/mL), methylprednisolone (Depo-Medrol 80 mg/mL was injected into each joint. PIRIFORMIS MUSCLE TRIGGER POINT INJECTION:   Using a 22 gauge needle x 5 inch spinal needle and fluoroscopic imaging the Left piriformis muscle was accessed just inferior and lateral to the inferior margin of the Left sacroiliac joint. Then, 0.3 mL of Omnipaque 240 confirmed avascular injection into the muscle, and 3 mL of 2% lidocaine was injected into the muscle and the needle removed. PROVOCATION: The patient did not report pain reproduction in a concordant distribution upon capsular distention of the Left sacroiliac joints. ARTHROGRAPHY: The Left sacroiliac arthrogram revealed contrast in the joint space and inferior recesses; no contrast extravasation. EBL: no blood loss    SPECIMEN: none    The patient tolerated the procedure well and without complications and was noted to be in stable condition prior to discharge from the procedure center with discharge instructions. IMPRESSIONS:  Left  sacroiliac arthrogram is abnormal: . Jeremias Gutter   Left  sacroiliac  joint injection  negative for provocation of the patient's pain symptoms. Bilateral piriformis muscle injection    RECOMMENDATIONS:  Complete and return Post-Procedure Pain and Activity Diary.   Contact the [address] for symptom exacerbation, fever or unusual symptoms. Post-procedure care according to verbal and written discharge instructions    PELVIC FLUOROSCOPIC IMAGE INTERPRETATION    EXAMINATION:  AP and lateral views. FLUORO TIME: 11 seconds    DISCUSSION:  Spot views of the spine reveal normal alignment and segmentation. Spinal needles are positioned in the Left sacroiliac joint. Contrast outlines the joint space and reveals excellent contrast flow. Spinal needles are placed just anterior to the sacrum for the piriformis muscle injection. Contrast pattern is consistent with contrast in the muscle. Visualized spine reveals See radiology report. Soft tissues reveal no abnormalities.     IMPRESSION:  Left sacroiliac joint arthrogram  with satiafactory needle placement and contrast dispersal.     Electronically signed by Wade Duran MD on 8/26/2022 at 9:17 AM

## 2022-08-26 NOTE — DISCHARGE INSTRUCTIONS
Home Care after Sacrocooccygeal/Sacroiliac Joint Injection    The doctor has done an injection in your lower back and/or muscle to decrease pain and inflammation. It can also help diagnose the source of your pain. You may feel sore at the injection site for the next 2-4 days. You may apply ice to the site for 20 minutes on and 20 minutes off to decrease pain and discomfort, if needed, for the first 24 hours. After 24 hours, you may use heat if needed. Your pain may subside right away, or it may take a number of days. This is because two medicines were used in the injection. The first, a local anesthetic, will only work for a few hours. The second, a steroid, may not start working for 2-5 days. Some patients have noticed no changes in their pain for up to 2 weeks. There may be a time after the local anesthetic wears off that you feel like you have more pain. This is called pain flare. If this happens:  Limit your activities for the first 24 hours to those that you can do without pain. Keep on taking your pain medicine as prescribed. Sometimes, some patients have had facial and neck flushing, anxiety or nervousness, and mood swings with the use of steroids. These symptoms most often occur within the first 24-48 hours and do not require any treatment. They should go away on their own within one week. If you have diabetes, steroids will cause your blood sugar to increase. Make sure your primary doctor is aware of this and that you have orders to treat your blood sugar to keep it within your normal range. You may have some weakness for the next 3-5 hours due to the anesthetic used. Take it easy. No baths or soaking the injection site for 24 hours after the procedure. Taking a shower is okay. You may resume taking your routine medicines after the procedure including pain medicines as prescribed. Resume any medications held for the procedure (blood thinners, aspirin, anti-inflammatories).     If you do not already have a follow-up appointment, call your referring doctors office to make one to discuss your results within 2-4 weeks after the treatment. Your doctor will have the report within 7-10 days. You will be given a pain log to complete for the next 14 days. Complete this form and make a copy for your own records. Then, mail it back to us or drop it off at the pain management clinic. We will need this information to decide the next step in your treatment plan. Signs of infection  Fever greater than 100.4°F by mouth for 2 readings taken 4 hours apart  Increased redness, swelling around the site  Any drainage from the site     If you have any new symptoms or any signs of infection, please call [phone] during business hours to notify us. You can also notify your primary care physician. After hours, nights and weekends, call [phone].

## 2022-08-26 NOTE — H&P
PAIN MANAGEMENTNEW PATIENT CONSULTATION  8/15/22    Iwona Gomez  1980    ReferringProvider:  [place]  [id number] [address] ,  [id number]    Primary Care Physician:  No primary care provider on file. No primary provider on file. HPIinformation obtained from the patient as well as the Comprehensive Pain ManagementHealth Questionnaire filled out by the patient. The questionnaire will be scannedinto the electronic chart and PMH, PSH, meds, and allergies will be updates accordingly. Subjective:       CHIEF COMPLAINT:  This is a37 y.o. male patientwho presents with a complaint of Back Pain (Lower ) and Hip Pain (Both )      PAIN HPI:    Pain in L lumbar  radiates to posterior lateral L leg to knee and  also along groin into medial  leg Has  point pain L mid gluteal     Location: Back L eft   Location Modifier: Left  Severity of Pain: 5  Duration of Pain: Intermittent  Frequency of Pain: Intermittent  Aggravating Factors: Stretching, Bending, Straightening, Standing, Walking, Stairs, Exercise, Kneeling, and Squatting  Limiting Behavior: no  Relieving Factors: Heat, Cold, and Medication -  Result of Injury: no  Work Related Injury: no  [place] of Worker Compensation Case: no      Prior evaluation:    Previous lower back problems:No    Previous workup: No   History of surgery in painful area:No    Previous pain medication trials have included:       Opioids: -     NSAID's:-     Muscle relaxants: -     Neuropathicpain meds: -    Previous Pain Management Physician: No   Nerve blocks, spinal injections:No   Typeof Pain Intervention -. Date of last Pain Intervention  .-   Was there pain relief from Pain Intervention: No.    How long was your pain relief from the Pain Intervention .-    Sleep:       Difficultyfalling asleep:  No   Takes sleepingmedication: No    Mental health:    Patient feels - secondary to their current pain problems as described above.    H/O depression and anxiety: No   Patient is not seeing psychologist orpsychiatrist   Abuse history? .    Employed? No  Alcohol use?: No  Tobacco use?: No  Marijuana use?: No  Illicit drug use?: No    Imaging: Reviewed available imagingin our system with the patient. No results found. Referring physician records reviewed. Review of Systems   Constitutional:  Positive for fatigue. HENT: Negative. Eyes: Negative. Respiratory: Negative. Cardiovascular: Negative. Gastrointestinal:  Negative for constipation and nausea. Endocrine: Negative. Genitourinary:  Negative for difficulty urinating. Musculoskeletal:  Positive for arthralgias, back pain and myalgias. Skin: Negative. Allergic/Immunologic: Negative. Neurological:  Positive for weakness and numbness. Hematological: Negative. Psychiatric/Behavioral:  Positive for sleep disturbance. All other systems reviewed and are negative. Allergies   Allergen Reactions    Advil [Ibuprofen]     Diclofenac Other (See Comments) and Diarrhea     Other reaction(s): Abdominal pain, Diarrhea, Diarrhea, Abdominal pain, pain  Abdominal pains and muscle spasms         Outpatient Medications Prior to Visit   Medication Sig Dispense Refill    nortriptyline (PAMELOR) 25 MG capsule Take 25 mg by mouth      diclofenac sodium (VOLTAREN) 1 % GEL Apply 4 g topically 4 times daily as needed for Pain 100 g 2    levothyroxine (SYNTHROID) 100 MCG tablet Take 0.5 tablets by mouth daily      vitamin B-12 (CYANOCOBALAMIN) 500 MCG tablet Take 500 mcg by mouth daily      vitamin D (ERGOCALCIFEROL) 10153 UNITS CAPS capsule Take 50,000 Units by mouth once a week       No facility-administered medications prior to visit.        Past Medical History:   Diagnosis Date    Anesthesia     trouble coming out of anesthesia and with morphine    Arthritis     Carpal tunnel syndrome, right     Diabetes mellitus (HCC)     Dizziness     GERD (gastroesophageal reflux disease)     no RX    Headache     IBS (irritable bowel syndrome)     PTSD (post-traumatic stress disorder)     [employer]     Shortness of breath     Sleep apnea     cpap    Thyroid disease     Wrist pain, right        Past Surgical History:   Procedure Laterality Date    CARPAL TUNNEL RELEASE Right 10/20/2016    CYST REMOVAL Left 2009    testicle    FOOT SURGERY  2007    SHOULDER SURGERY Right 04/2017    [facility] / [facility]    TESTICLE REMOVAL Left 2010    VASECTOMY  2010       Family History   Problem Relation Age of Onset    Heart Disease Father     High Blood Pressure Father     Stroke Father     Bleeding Prob Father     Kidney Disease Father     Thyroid Disease Father     Alcohol Abuse Father     Other Father         respiratory illness    Diabetes Maternal Grandmother     Diabetes Maternal Grandfather     Breast Cancer Paternal Grandmother     Diabetes Paternal Grandmother     Diabetes Paternal Grandfather     Glaucoma Other      Social History     Socioeconomic History    Marital status:      Spouse name: None    Number of children: None    Years of education: None    Highest education level: None   Tobacco Use    Smoking status: Former     Years: 7.00     Types: Cigarettes    Smokeless tobacco: Never   Substance and Sexual Activity    Alcohol use: No    Drug use: Yes     Types: Marijuana (Weed)    Sexual activity: Yes     Partners: Female         Objective:     Physical Exam:  Vitals:    08/15/22 1418   BP: 120/84   Pulse: 93   Resp: 17   SpO2: 96%   Weight: 299 lb (135.6 kg)   Height: 6' 1\" (1.854 m)          Physical Exam  Constitutional:       Appearance: He is well-developed. HENT:      Head: Normocephalic and atraumatic. Cardiovascular:      Pulses: Normal pulses. Comments: Warm extremities. Normal capillary refill. Pulmonary:      Effort: Pulmonary effort is normal.   Abdominal:      General: Abdomen is flat. Palpations: Abdomen is soft. Skin:     General: Skin is warm and dry.    Neurological:      Mental Status: He is alert and oriented to person, place, and time. Cranial Nerves: No cranial nerve deficit. Sensory: No sensory deficit. Motor: No atrophy or abnormal muscle tone. Deep Tendon Reflexes: Reflexes are normal and symmetric. Psychiatric:         Speech: Speech normal.         Behavior: Behavior normal.       Back Exam     Tenderness   The patient is experiencing tenderness in the lumbar (+ fabers  ? ++SLR+Slump  ?). Labs:   Lab Results   Component Value Date    WBC 9.0 03/07/2017    HGB 14.3 03/07/2017    HCT 41.3 03/07/2017     03/07/2017     (H) 04/11/2017    AST 85 (H) 04/11/2017     04/11/2017    K 4.0 04/11/2017    CL 96 (L) 04/11/2017    CREATININE 0.89 04/11/2017    BUN 13 04/11/2017    CO2 24 04/11/2017    TSH 3.02 04/11/2017    LABA1C 9.4 (H) 04/11/2017    LABMICR CANNOT BE CALCULATED 05/11/2017       Assessment: This is a 43 y.o. male with the following diagnosis:     Pain Diagnoses:  1. Sacroiliitis ([facility] [address]    2. Protrusion of lumbar intervertebral disc        Medical/ Psychological Comorbidities:  As listed in the past medical and surgical history    Functional Limitations secondary to the above problems:  Chronic painlimits function and quality of life    Plan:   L S-I injection and follow      Meds:   New Prescriptions    No medications on file      No orders of the defined types were placed in this encounter. Controlled Substances Monitoring:    OARRS report was reviewed for [place], [place]. Pt educated about the risks of taking opiates, including increasedsedation, constipation, slowed breathing, tolerance, dependence, and addiction. No orders of the defined types were placed in this encounter. No follow-ups on file. The patient expressed understanding of the above assessment and plan.     Totaltime spent face to face with patient was 25 minutes inwhich  50% or more of the time was spent in counseling, education about risk andbenefits of the above plan, and coordination of care.

## 2022-09-20 ENCOUNTER — TELEPHONE (OUTPATIENT)
Dept: PAIN MANAGEMENT | Age: 42
End: 2022-09-20

## 2022-09-20 ENCOUNTER — OFFICE VISIT (OUTPATIENT)
Dept: PAIN MANAGEMENT | Age: 42
End: 2022-09-20
Payer: OTHER GOVERNMENT

## 2022-09-20 VITALS
WEIGHT: 293.5 LBS | DIASTOLIC BLOOD PRESSURE: 76 MMHG | SYSTOLIC BLOOD PRESSURE: 110 MMHG | HEIGHT: 73 IN | BODY MASS INDEX: 38.9 KG/M2 | RESPIRATION RATE: 18 BRPM | OXYGEN SATURATION: 97 % | HEART RATE: 78 BPM

## 2022-09-20 DIAGNOSIS — M47.817 LUMBOSACRAL SPONDYLOSIS WITHOUT MYELOPATHY: Primary | ICD-10-CM

## 2022-09-20 DIAGNOSIS — M54.17 LUMBOSACRAL RADICULOPATHY: ICD-10-CM

## 2022-09-20 DIAGNOSIS — M53.3 SACROILIAC PAIN: ICD-10-CM

## 2022-09-20 PROCEDURE — 99215 OFFICE O/P EST HI 40 MIN: CPT | Performed by: PHYSICAL MEDICINE & REHABILITATION

## 2022-09-20 PROCEDURE — 99214 OFFICE O/P EST MOD 30 MIN: CPT | Performed by: PHYSICAL MEDICINE & REHABILITATION

## 2022-09-20 ASSESSMENT — ENCOUNTER SYMPTOMS
BACK PAIN: 1
CONSTIPATION: 0
ALLERGIC/IMMUNOLOGIC NEGATIVE: 1
NAUSEA: 0
EYES NEGATIVE: 1
RESPIRATORY NEGATIVE: 1

## 2022-09-20 NOTE — PROGRESS NOTES
PAIN MANAGEMENT FOLLOW-UP NOTE  9/20/22    CHIEF COMPLAINT: This is a37 y.o. male patientwho returns to the Pain Management Clinic with a history of Injections and Follow-up      PAIN HPI:Corbin Farfan Felipe Walker returns today for  reevaluation. Since the visit, the patient reports that the pain is not changed. 60 % better with L S-I  still has   gluteal pain    no new Bowel and bladder  says chiropractor  not on plan will look for other      Location: Back  Location Modifier: entire back  Severity of Pain: 4  Duration of Pain: Intermittent  Frequency of Pain: Intermittent  Aggravating Factors:  -  Limiting Behavior: Bending   Relieving Factors: relaxation        Previous pain medication trials have included:          Mental health:    Patient feels - secondary to their current pain problems as described above. H/O depression and anxiety: No   Patient is not seeing psychologist orpsychiatrist   Abuse history? No    Employed? No    ANALGESIA:   Are your Current Pain medication (s) helping to decrease pain? No.   Current Pain score:      ADVERSE AFFECTS:   Medication Side Effects: No.    ACTIVITY:  Are you able to be more active with your pain medications? No      ABERRANT BEHAVIORS SINCE LAST VISIT? No    Review of Systems   Constitutional:  Positive for fatigue. HENT: Negative. Eyes: Negative. Respiratory: Negative. Cardiovascular: Negative. Gastrointestinal:  Negative for constipation and nausea. Endocrine: Negative. Genitourinary:  Negative for difficulty urinating. Musculoskeletal:  Positive for arthralgias, back pain and myalgias. Skin: Negative. Allergic/Immunologic: Negative. Neurological:  Positive for weakness and numbness. Hematological: Negative. Psychiatric/Behavioral:  Positive for sleep disturbance. All other systems reviewed and are negative.      Allergies   Allergen Reactions    Advil [Ibuprofen]      Intolerance to Advil only- tolerates generic ibuprofen Diclofenac Diarrhea and Other (See Comments)              Outpatient Medications Prior to Visit   Medication Sig Dispense Refill    nortriptyline (PAMELOR) 25 MG capsule Take 25 mg by mouth      diclofenac sodium (VOLTAREN) 1 % GEL Apply 4 g topically 4 times daily as needed for Pain 100 g 2    levothyroxine (SYNTHROID) 100 MCG tablet Take 0.5 tablets by mouth daily      vitamin B-12 (CYANOCOBALAMIN) 500 MCG tablet Take 500 mcg by mouth daily      vitamin D (ERGOCALCIFEROL) 65367 UNITS CAPS capsule Take 50,000 Units by mouth once a week       No facility-administered medications prior to visit.        Past Medical History:   Diagnosis Date    Anesthesia     trouble coming out of anesthesia and with morphine    Arthritis     Carpal tunnel syndrome, right     Diabetes mellitus (HCC)     Dizziness     GERD (gastroesophageal reflux disease)     no RX    Headache     IBS (irritable bowel syndrome)     PTSD (post-traumatic stress disorder)     [employer]     Shortness of breath     Sleep apnea     cpap    Thyroid disease     Wrist pain, right        Past Surgical History:   Procedure Laterality Date    BACK INJECTION Left 8/26/2022    Left SACROILIAC JOINT Injection performed by Carina Servin MD at [address] Right 10/20/2016    CYST REMOVAL Left 2009    testicle    FOOT SURGERY  2007    SHOULDER SURGERY Right 04/2017    [facility] / [facility]hersa [facility]    TESTICLE REMOVAL Left 2010    VASECTOMY  2010     Family History   Problem Relation Age of Onset    Heart Disease Father     High Blood Pressure Father     Stroke Father     Bleeding Prob Father     Kidney Disease Father     Thyroid Disease Father     Alcohol Abuse Father     Other Father         respiratory illness    Diabetes Maternal Grandmother     Diabetes Maternal Grandfather     Breast Cancer Paternal Grandmother     Diabetes Paternal Grandmother     Diabetes Paternal Grandfather     Glaucoma Other      Social History     Socioeconomic History    Marital status:      Spouse name: None    Number of children: None    Years of education: None    Highest education level: None   Tobacco Use    Smoking status: Former     Years: 7.00     Types: Cigarettes    Smokeless tobacco: Never   Substance and Sexual Activity    Alcohol use: No    Drug use: Yes     Types: Marijuana Sapna Soy)    Sexual activity: Yes     Partners: Female         Family and Social Historyreviewed in the electronic medical record. Imaging:Reviewed available imaging in our system with the patient. No results found. Objective:     Physical Exam:  Vitals:    09/20/22 1403   BP: 110/76   Pulse: 78   Resp: 18   SpO2: 97%   Weight: 293 lb 8 oz (133.1 kg)   Height: 6' 1\" (1.854 m)          Physical Exam  Vitals reviewed. Nursing note reviewed: yes. Constitutional:       Appearance: He is well-developed. HENT:      Head: Normocephalic and atraumatic. Cardiovascular:      Pulses: Normal pulses. Comments: Warm extremities. Normal capillary refill. Pulmonary:      Effort: Pulmonary effort is normal.   Abdominal:      General: Abdomen is flat. Palpations: Abdomen is soft. Musculoskeletal:      Lumbar back: Negative right straight leg raise test and negative left straight leg raise test.   Skin:     General: Skin is warm and dry. Neurological:      Mental Status: He is alert and oriented to person, place, and time. Cranial Nerves: No cranial nerve deficit. Sensory: No sensory deficit. Motor: No atrophy or abnormal muscle tone. Deep Tendon Reflexes: Reflexes are normal and symmetric. Psychiatric:         Speech: Speech normal.         Behavior: Behavior normal.     Back Exam     Tenderness   The patient is experiencing tenderness in the lumbar (+K[place]ps + slump Left  +Faberes).     Range of Motion   Extension:  normal   Flexion:  normal   Lateral bend right:  normal   Lateral bend left:  normal   Rotation right:  normal   Rotation left:  normal Muscle Strength   Right Quadriceps:  5/5   Left Quadriceps:  5/5   Right Hamstrings:  5/5   Left Hamstrings:  5/5     Tests   Straight leg raise right: negative  Straight leg raise left: negative    Other   Toe walk: normal  Heel walk: normal  Sensation: normal  Gait: normal                                 Research  has found that  Spine injections    reduce pain and  give  better functional  outcomes. Assessment: This is a 43 y.o. male patient with:    Diagnosis:   Diagnosis Orders   1. Lumbosacral spondylosis without myelopathy        2. Sacroiliac pain        3. Lumbosacral radiculopathy            Medical Comorbidities:  As listed in the patient's past medical and surgical history    Functional Limitations:   Pain limits function and quality of life. Plan:   LL4,5 TFE  valium    Meds:   Controlled Substances Monitoring: Pt educated about the risks of taking opiates,including increased sedation, constipation, slowed breathing, tolerance, dependence,and addiction. New Prescriptions    No medications on file      No orders of the defined types were placed in this encounter. No orders of the defined types were placed in this encounter. No follow-ups on file. Opioid medication has  significant  risk  benefit concerns. We  instruct    our patients that  a Random Urine Drug Screen is required  along with a  an Opioid assessment questionaire such as ORT  or SOAPP  The patient expressed understanding of the above assessment and plan. Totaltime spent face to face with patient was 25 minutes inwhich  50% or more of the time was spent in counseling, education about risk andbenefits of the above plan, and coordination of care.

## 2022-09-20 NOTE — PATIENT INSTRUCTIONS
[facility]  [address]  Telephone [phone]  Fax [phone]      PROCEDURE INSTRUCTIONS FOR  PAIN MANAGEMENT PROCEDURES WITHOUT IV SEDATION    Iwonaanjel Felders scheduled to see Dr. Roopa Mcpherson to undergo the following procedure:  Left L4 and L5 Transforaminal Epidural Steroid Injection     Procedure Date: ____10/6/22____  You will receive a phone call the day prior to your procedure to confirm a time of arrival.    Report to the [place] [id number], Registration office on the 1st floor in the hospital, after check in and signing of paper work you will then go to the second floor to the surgery center. 1. Stop the following medications prior to the procedure:    none  Stop blood thinners as directed before the injection, with permission from your cardiologist or primary care physician. We will send a letter to them requesting permission to hold the blood thinners. If you take Warfarin (Coumadin), you must have your blood drawn for an INR the day before the procedure. INR must be less than 1.5. if not complete prior to check in the procedure this will be drawn at the procedure center prior to having the procedure completed. 2.  Take all routine medications unless otherwise instructed. Ok to take vitamins and antiinflammatory medications    3. EATING & DRINKING:  Ok to eat or drink before the injection - no IV sedation will be used. 4. If you are allergic to contrast or iodine, you must take benadryl and prednisone prior to the injection to prevent an allergic reaction. Follow the directions on the prescription for the times to take the medication. 5. Oral valium can be prescribed if your are anxious about the injections or feel that you can not lay still during the injection. If you take valium, you must have a . Make arrangements for a family member or friend to drive you to the surgery center.   Your ride must stay in the hospital while you are having the injection done. If they cannot stay, the injection will be rescheduled. The valium may affect your judgment following the procedure and driving a vehicle within 24 hours after the sedation could be dangerous. 6.  Wear simple loose clothing, which can be easily changed. 7.  Leave jewelry (including rings) and other valuables at home. 8.  You will be asked to sign several forms prior to surgery; patients under the age of 25 must have a parent or legal guardian sign the permit to be able to do the procedure. 9.  You must have finished any antibiotic prescribed for recent infections. If required, please take pre-procedure antibiotic or other pre-procedure medications as instructed. 10. Bring inhalers and pain medications with you to your procedure. 11. Bring your MRI/CT films if they were done outside of the [facility]. 12. If you should develop a cold, sore throat, cough, fever or other new indication of illness or infection, or are started on antibiotics within 2 weeks of the scheduled procedure, please notify the [facility] office as early as possible at [phone]. If calling after 4:30pm the day prior to your scheduled procedure please contact [phone] and Leave a Voice Message.

## 2022-09-22 RX ORDER — DIAZEPAM 5 MG/1
TABLET ORAL
Qty: 2 TABLET | Refills: 0 | Status: SHIPPED | OUTPATIENT
Start: 2022-09-22 | End: 2022-10-31 | Stop reason: ALTCHOICE

## 2022-10-06 ENCOUNTER — APPOINTMENT (OUTPATIENT)
Dept: GENERAL RADIOLOGY | Age: 42
End: 2022-10-06
Attending: PHYSICAL MEDICINE & REHABILITATION
Payer: OTHER GOVERNMENT

## 2022-10-06 ENCOUNTER — HOSPITAL ENCOUNTER (OUTPATIENT)
Age: 42
Setting detail: OUTPATIENT SURGERY
Discharge: HOME | End: 2022-10-06
Attending: PHYSICAL MEDICINE & REHABILITATION | Admitting: PHYSICAL MEDICINE & REHABILITATION
Payer: OTHER GOVERNMENT

## 2022-10-06 VITALS
HEIGHT: 73 IN | BODY MASS INDEX: 38.7 KG/M2 | TEMPERATURE: 97.5 F | WEIGHT: 292 LBS | SYSTOLIC BLOOD PRESSURE: 129 MMHG | RESPIRATION RATE: 18 BRPM | HEART RATE: 71 BPM | OXYGEN SATURATION: 96 % | DIASTOLIC BLOOD PRESSURE: 98 MMHG

## 2022-10-06 PROBLEM — M47.816 LUMBAR SPONDYLOSIS: Status: ACTIVE | Noted: 2022-10-06

## 2022-10-06 PROBLEM — M54.16 LUMBAR RADICULITIS: Status: ACTIVE | Noted: 2022-10-06

## 2022-10-06 PROCEDURE — 3209999900 FLUORO FOR SURGICAL PROCEDURES

## 2022-10-06 PROCEDURE — 3600000056 HC PAIN LEVEL 4 BASE: Performed by: PHYSICAL MEDICINE & REHABILITATION

## 2022-10-06 PROCEDURE — 6360000002 HC RX W HCPCS: Performed by: PHYSICAL MEDICINE & REHABILITATION

## 2022-10-06 PROCEDURE — 2709999900 HC NON-CHARGEABLE SUPPLY: Performed by: PHYSICAL MEDICINE & REHABILITATION

## 2022-10-06 PROCEDURE — 2500000003 HC RX 250 WO HCPCS: Performed by: PHYSICAL MEDICINE & REHABILITATION

## 2022-10-06 PROCEDURE — 6360000004 HC RX CONTRAST MEDICATION: Performed by: PHYSICAL MEDICINE & REHABILITATION

## 2022-10-06 PROCEDURE — 7100000011 HC PHASE II RECOVERY - ADDTL 15 MIN: Performed by: PHYSICAL MEDICINE & REHABILITATION

## 2022-10-06 PROCEDURE — 2580000003 HC RX 258: Performed by: PHYSICAL MEDICINE & REHABILITATION

## 2022-10-06 PROCEDURE — A4216 STERILE WATER/SALINE, 10 ML: HCPCS | Performed by: PHYSICAL MEDICINE & REHABILITATION

## 2022-10-06 PROCEDURE — 7100000010 HC PHASE II RECOVERY - FIRST 15 MIN: Performed by: PHYSICAL MEDICINE & REHABILITATION

## 2022-10-06 RX ORDER — DEXAMETHASONE SODIUM PHOSPHATE 10 MG/ML
INJECTION INTRAMUSCULAR; INTRAVENOUS PRN
Status: DISCONTINUED | OUTPATIENT
Start: 2022-10-06 | End: 2022-10-06 | Stop reason: ALTCHOICE

## 2022-10-06 RX ORDER — BUPIVACAINE HYDROCHLORIDE 5 MG/ML
INJECTION, SOLUTION EPIDURAL; INTRACAUDAL PRN
Status: DISCONTINUED | OUTPATIENT
Start: 2022-10-06 | End: 2022-10-06 | Stop reason: ALTCHOICE

## 2022-10-06 RX ORDER — SODIUM CHLORIDE 9 MG/ML
INJECTION INTRAVENOUS PRN
Status: DISCONTINUED | OUTPATIENT
Start: 2022-10-06 | End: 2022-10-06 | Stop reason: ALTCHOICE

## 2022-10-06 ASSESSMENT — ENCOUNTER SYMPTOMS
ALLERGIC/IMMUNOLOGIC NEGATIVE: 1
NAUSEA: 0
EYES NEGATIVE: 1
CONSTIPATION: 0
RESPIRATORY NEGATIVE: 1
BACK PAIN: 1

## 2022-10-06 ASSESSMENT — PAIN DESCRIPTION - DESCRIPTORS
DESCRIPTORS: ACHING;THROBBING
DESCRIPTORS: ACHING;BURNING;SHARP;SHOOTING;THROBBING

## 2022-10-06 ASSESSMENT — PAIN DESCRIPTION - FREQUENCY: FREQUENCY: CONTINUOUS

## 2022-10-06 ASSESSMENT — PAIN DESCRIPTION - PAIN TYPE: TYPE: CHRONIC PAIN

## 2022-10-06 ASSESSMENT — PAIN DESCRIPTION - LOCATION: LOCATION: BACK

## 2022-10-06 ASSESSMENT — PAIN SCALES - GENERAL: PAINLEVEL_OUTOF10: 6

## 2022-10-06 ASSESSMENT — PAIN - FUNCTIONAL ASSESSMENT: PAIN_FUNCTIONAL_ASSESSMENT: 0-10

## 2022-10-06 ASSESSMENT — PAIN DESCRIPTION - ORIENTATION: ORIENTATION: LEFT

## 2022-10-06 NOTE — INTERVAL H&P NOTE
I have interviewed and examined the patient and reviewed the recent History and Physical.  There have been no changes to the recent H&P documentation. The surgical consent form has been signed. Last anticoagulant medication use was:na    Premedication taken for contrast allergy? No    Valium taken for oral sedation? No    No outpatient medications have been marked as taking for the 10/6/22 encounter [facility] Encounter). The patient understands the planned operation and its associated risks and benefits and agrees to proceed.         Electronically signed by Rudy Fitch MD on 10/6/2022 at 9:29 AM

## 2022-10-06 NOTE — H&P
PAIN MANAGEMENT FOLLOW-UP NOTE  9/20/22    CHIEF COMPLAINT: This is a37 y.o. male patientwho returns to the [facility] with a history of Injections and Follow-up      PAIN HPI:[facility] Avani Rodrigues returns today for  reevaluation. Since the visit, the patient reports that the pain is not changed. 60 % better with L S-I  still has   gluteal pain    no new Bowel and bladder  says chiropractor  not on plan will look for other      Location: Back  Location Modifier: entire back  Severity of Pain: 4  Duration of Pain: Intermittent  Frequency of Pain: Intermittent  Aggravating Factors:  -  Limiting Behavior: Bending   Relieving Factors: relaxation        Previous pain medication trials have included:          Mental health:    Patient feels - secondary to their current pain problems as described above. H/O depression and anxiety: No   Patient is not seeing psychologist orpsychiatrist   Abuse history? No    Employed? No    ANALGESIA:   Are your Current Pain medication (s) helping to decrease pain? No.   Current Pain score:      ADVERSE AFFECTS:   Medication Side Effects: No.    ACTIVITY:  Are you able to be more active with your pain medications? No      ABERRANT BEHAVIORS SINCE LAST VISIT? No    Review of Systems   Constitutional:  Positive for fatigue. HENT: Negative. Eyes: Negative. Respiratory: Negative. Cardiovascular: Negative. Gastrointestinal:  Negative for constipation and nausea. Endocrine: Negative. Genitourinary:  Negative for difficulty urinating. Musculoskeletal:  Positive for arthralgias, back pain and myalgias. Skin: Negative. Allergic/Immunologic: Negative. Neurological:  Positive for weakness and numbness. Hematological: Negative. Psychiatric/Behavioral:  Positive for sleep disturbance. All other systems reviewed and are negative.      Allergies   Allergen Reactions    Advil [Ibuprofen]      Intolerance to Advil only- tolerates generic ibuprofen Diclofenac Diarrhea and Other (See Comments)              Outpatient Medications Prior to Visit   Medication Sig Dispense Refill    nortriptyline (PAMELOR) 25 MG capsule Take 25 mg by mouth      diclofenac sodium (VOLTAREN) 1 % GEL Apply 4 g topically 4 times daily as needed for Pain 100 g 2    levothyroxine (SYNTHROID) 100 MCG tablet Take 0.5 tablets by mouth daily      vitamin B-12 (CYANOCOBALAMIN) 500 MCG tablet Take 500 mcg by mouth daily      vitamin D (ERGOCALCIFEROL) 31711 UNITS CAPS capsule Take 50,000 Units by mouth once a week       No facility-administered medications prior to visit.        Past Medical History:   Diagnosis Date    Anesthesia     trouble coming out of anesthesia and with morphine    Arthritis     Carpal tunnel syndrome, right     Diabetes mellitus (HCC)     Dizziness     GERD (gastroesophageal reflux disease)     no RX    Headache     IBS (irritable bowel syndrome)     PTSD (post-traumatic stress disorder)     [employer]     Shortness of breath     Sleep apnea     cpap    Thyroid disease     Wrist pain, right        Past Surgical History:   Procedure Laterality Date    BACK INJECTION Left 8/26/2022    Left SACROILIAC JOINT Injection performed by Heaven Sanchez MD at [address] Right 10/20/2016    CYST REMOVAL Left 2009    testicle    FOOT SURGERY  2007    SHOULDER SURGERY Right 04/2017    [facility] / [facility]    TESTICLE REMOVAL Left 2010    VASECTOMY  2010     Family History   Problem Relation Age of Onset    Heart Disease Father     High Blood Pressure Father     Stroke Father     Bleeding Prob Father     Kidney Disease Father     Thyroid Disease Father     Alcohol Abuse Father     Other Father         respiratory illness    Diabetes Maternal Grandmother     Diabetes Maternal Grandfather     Breast Cancer Paternal Grandmother     Diabetes Paternal Grandmother     Diabetes Paternal Grandfather     Glaucoma Other      Social History     Socioeconomic History    Marital status:      Spouse name: None    Number of children: None    Years of education: None    Highest education level: None   Tobacco Use    Smoking status: Former     Years: 7.00     Types: Cigarettes    Smokeless tobacco: Never   Substance and Sexual Activity    Alcohol use: No    Drug use: Yes     Types: Marijuana Garon Knapp)    Sexual activity: Yes     Partners: Female         Family and Social Historyreviewed in the electronic medical record. Imaging:Reviewed available imaging in our system with the patient. No results found. Objective:     Physical Exam:  Vitals:    09/20/22 1403   BP: 110/76   Pulse: 78   Resp: 18   SpO2: 97%   Weight: 293 lb 8 oz (133.1 kg)   Height: 6' 1\" (1.854 m)          Physical Exam  Vitals reviewed. Nursing note reviewed: yes. Constitutional:       Appearance: He is well-developed. HENT:      Head: Normocephalic and atraumatic. Cardiovascular:      Pulses: Normal pulses. Comments: Warm extremities. Normal capillary refill. Pulmonary:      Effort: Pulmonary effort is normal.   Abdominal:      General: Abdomen is flat. Palpations: Abdomen is soft. Musculoskeletal:      Lumbar back: Negative right straight leg raise test and negative left straight leg raise test.   Skin:     General: Skin is warm and dry. Neurological:      Mental Status: He is alert and oriented to person, place, and time. Cranial Nerves: No cranial nerve deficit. Sensory: No sensory deficit. Motor: No atrophy or abnormal muscle tone. Deep Tendon Reflexes: Reflexes are normal and symmetric. Psychiatric:         Speech: Speech normal.         Behavior: Behavior normal.     Back Exam     Tenderness   The patient is experiencing tenderness in the lumbar (+Kemps + slump Left  +Faberes).     Range of Motion   Extension:  normal   Flexion:  normal   Lateral bend right:  normal   Lateral bend left:  normal   Rotation right:  normal   Rotation left:  normal Muscle Strength   Right Quadriceps:  5/5   Left Quadriceps:  5/5   Right Hamstrings:  5/5   Left Hamstrings:  5/5     Tests   Straight leg raise right: negative  Straight leg raise left: negative    Other   Toe walk: normal  Heel walk: normal  Sensation: normal  Gait: normal                                 Research  has found that  Spine injections    reduce pain and  give  better functional  outcomes. Assessment: This is a 43 y.o. male patient with:    Diagnosis:   Diagnosis Orders   1. Lumbosacral spondylosis without myelopathy        2. Sacroiliac pain        3. Lumbosacral radiculopathy            Medical Comorbidities:  As listed in the patient's past medical and surgical history    Functional Limitations:   Pain limits function and quality of life. Plan:   LL4,5 TFE  valium    Meds:   Controlled Substances Monitoring: Pt educated about the risks of taking opiates,including increased sedation, constipation, slowed breathing, tolerance, dependence,and addiction. New Prescriptions    No medications on file      No orders of the defined types were placed in this encounter. No orders of the defined types were placed in this encounter. No follow-ups on file. Opioid medication has  significant  risk  benefit concerns. We  instruct    our patients that  a Random Urine Drug Screen is required  along with a  an Opioid assessment questionaire such as ORT  or SOAPP  The patient expressed understanding of the above assessment and plan. Totaltime spent face to face with patient was 25 minutes inwhich  50% or more of the time was spent in counseling, education about risk andbenefits of the above plan, and coordination of care.

## 2022-10-06 NOTE — DISCHARGE INSTRUCTIONS
Home Care after Transforaminal Epidural Steroid Injection/Nerve Block    The doctor has done an injection in your neck; upper back; or lower back to       decrease pain and inflammation. This may help diagnose the source of your pain. You may feel sore at the injection site for the next 2-4 days. You may apply ice to the site for 20 minutes on and 20 minutes off to decrease pain and discomfort, if needed, for the first 24 hours. After 24 hours, you may use heat if needed. Remove the band-aid in 24 hours from the injection site. Your pain may subside right away, or it may take a number of days. This is because two medicines were used in the injection. The first, a local anesthetic, will only work for a few hours. The second, a steroid, may not start working for 2-5 days. Some patients have noticed no changes in their pain for up to 2 weeks. There may be a time after the local anesthetic wears off that you feel like you have more pain. This is called pain flare. If this happens  Limit your activities for the first 24 hours to those that you can do without pain. Keep on taking your pain medicine as prescribed. Sometimes, some patients have had facial and neck flushing, anxiety or nervousness, and mood swings with the use of steroids. These symptoms most often occur within the first 24-48 hours and do not require any treatment. They should go away on their own within one week. If you have diabetes, steroids will cause your blood sugar to increase. Make sure your primary doctor is aware of this and that you have orders to treat your blood sugar to keep it within your normal range. You may have some weakness for the next 3-5 hours due to the anesthetic used. Take it easy. No baths or soaking the injection site for 24 hours after the procedure. Taking a shower is okay. You may resume taking your routine medicines after the procedure including pain medicines as prescribed.  Resume any medications held for the procedure (blood thinners, aspirin, anti-inflammatories)       If you do not already have a follow-up appointment, call your referring doctors office to make one to discuss your results within 2-4 weeks after the treatment. Your doctor will have the report within 7-10 days. You will be given a pain log to complete for the next 14 days. Complete this form and make a copy for your own records. Then, mail it back to us or drop it off at the pain management clinic. We will need this information to decide the next step in your treatment plan. Signs of infection  Fever greater than 100.4°F by mouth for 2 readings taken 4 hours apart  Increased redness, swelling around the site  Any drainage from the site    If you have any new symptoms or any signs of infection, please call [phone] during business hours to notify us. You can also notify your primary care physician. After hours, nights and weekends, call [phone].

## 2022-10-10 NOTE — OP NOTE
TRANSFORAMINAL EPIDURAL STEROID INJECTION    10/6/22The patient was counseled at length about the risks of kimo Covid-19 during their perioperative period and any recovery window from their procedure. The patient was made aware that kimo Covid-19  may worsen their prognosis for recovering from their procedure  and lend to a higher morbidity and/or mortality risk. All material risks, benefits, and reasonable alternatives including postponing the procedure were discussed. The patient does wish to proceed with the procedure at this time. 22    Surgeon: Angela Hankins MD    Pre-operative Diagnosis:   Hospital Problems             Last Modified POA    * (Principal) Lumbar radiculitis 10/6/2022 Yes    Lumbar spondylosis 10/6/2022 Yes       Post-operative Diagnosis: Same    Assistants: none    This is a 43 y.o. male patient with pain in the Back, left leg. Previous treatment and examination findings are noted in the H&P. LL4,5 transforaminal epidural injection has been requested for diagnostic and therapeutic reasons. LL4,5    Conservative treatment was ineffective i.e.: ice, NSAIDS, rest, narcotic medication, chiropractic care, physical therapy and message therapy. Patient is unable to perform the following ADL's: ambulating     Pain Assessment: 0-10  Pain Level: 8     Pain Orientation: Lower, Mid  Pain Location: Back  Pain Descriptors: Aching, Burning, Sharp, Shooting, Throbbing    Last Plain films:       EXAMINATION:  LL4,5 transforaminal radiculogram/epidurogram.   LL4,5 transforaminal epidural anesthetic injection. LL4,5 transforaminal epidural steroid injection. CONSENT: Written consent was obtained from the patient on preprinted consent form after explaining the procedure, indications, potential complications and outcomes. Alternative treatments were also discussed. DISCUSSION: The patient was sterilely prepped and draped in the usual fashion in the prone position.  Time out was verified for correct patient, side, level and procedure. SEDATION:      PROCEDURE:  Under image-intensifier control, a 22 gauge needle x 5 inch spinal needle was guided successfully into the epidural space employing a posterior lateral/oblique approach. Needle aspiration was negative for heme or CSF. Instillation of  .5 mL of Omnipaque 240 contrast medium opacified the spinal nerve and demonstrated contiguous flow into the [id number] space. No vascular spread was noted. Digital subtraction was not employed to evaluate for vascular spread. The patient was monitored for any untoward reaction to contrast medium before proceeding with procedure #2. The patient did not report pain reproduction in a concordant distribution. Following needle position verification, a test dose of .5 mL of sterile lidocaine 0.5% was administered and patient monitored for any adverse effects. Then, 1 mL of  was instilled into the epidural space and the patient's response was again monitored. Finally, Dexamethasone (Decadron 10 mg/mL)  1 ml of 0.5% bupivacaine was then instilled. The patient's response was again monitored. The spinal needle was removed. Instillation of  .5 mL of Omnipaque 240 contrast medium opacified the spinal nerve and demonstrated contiguous flow into the [id number] space. No vascular spread was noted. Digital subtraction was not employed to evaluate for vascular spread. The patient was monitored for any untoward reaction to contrast medium before proceeding with procedure #2. The patient did not report pain reproduction in a concordant distribution. Following needle position verification, a test dose of .5 mL of sterile lidocaine 0.5% was administered and patient monitored for any adverse effects. Then, 1 mL of  was instilled into the epidural space and the patient's response was again monitored. Finally, Dexamethasone (Decadron 10 mg/mL)  1 ml of 0.5% bupivacaine was then instilled.  The patient's response was again monitored. The spinal needle was removed. The patient tolerated the procedure well and without complications and was noted to be in stable condition prior to discharge from the procedure center with discharge instructions. EBL: no blood loss    SPECIMEN: none    IMPRESSIONS:  LL4,5 transforaminal epidurogram, epidural anesthesia and epidural steroid injection procedures accomplished without incident. RECOMMENDATIONS:  Complete and return Post-Procedure Pain and Activity Diary.   Contact the [address] for symptom exacerbation, fever or unusual symptoms. Post-procedure care according to verbal and written discharge instructions    POST-PROCEDURE EPIDUROGRAPHY INTERPRETATION:    EXAMINATION: AP, lateral, and oblique views    FLUORO TIME: 12 seconds    DISCUSSION: Spot views of the spine reveal normal alignment and segmentation. Spinal needle is positioned at the LL4,5 neuroforamin. Contrast spreads and outlines the LL4,5 nerve/ neuroforamin and epidural space. The epidurogram reveals excellent contrast flow. Visualized spine reveals See radiology report. Soft tissues reveal no abnormalities. IMPRESSION: LL4,5 transforaminal epidurogram/epineurogram reveals satisfactory needle position and contrast spread.      Electronically signed by Angela Hankins MD on 10/9/2022 at 8:06 PM

## 2022-10-31 ENCOUNTER — OFFICE VISIT (OUTPATIENT)
Dept: PAIN MANAGEMENT | Age: 42
End: 2022-10-31
Payer: OTHER GOVERNMENT

## 2022-10-31 VITALS
BODY MASS INDEX: 39.63 KG/M2 | OXYGEN SATURATION: 98 % | HEART RATE: 88 BPM | HEIGHT: 73 IN | SYSTOLIC BLOOD PRESSURE: 128 MMHG | WEIGHT: 299 LBS | DIASTOLIC BLOOD PRESSURE: 80 MMHG

## 2022-10-31 DIAGNOSIS — M25.572 CHRONIC PAIN OF LEFT ANKLE: Primary | ICD-10-CM

## 2022-10-31 DIAGNOSIS — G89.29 CHRONIC PAIN OF LEFT ANKLE: Primary | ICD-10-CM

## 2022-10-31 DIAGNOSIS — M54.16 LUMBAR RADICULITIS: ICD-10-CM

## 2022-10-31 DIAGNOSIS — M47.816 LUMBAR SPONDYLOSIS: ICD-10-CM

## 2022-10-31 DIAGNOSIS — G62.9 PERIPHERAL POLYNEUROPATHY: ICD-10-CM

## 2022-10-31 PROCEDURE — 99214 OFFICE O/P EST MOD 30 MIN: CPT | Performed by: NURSE PRACTITIONER

## 2022-10-31 PROCEDURE — 99213 OFFICE O/P EST LOW 20 MIN: CPT | Performed by: NURSE PRACTITIONER

## 2022-10-31 ASSESSMENT — ENCOUNTER SYMPTOMS
SHORTNESS OF BREATH: 0
CONSTIPATION: 1
BACK PAIN: 1

## 2022-10-31 NOTE — PROGRESS NOTES
Subjective:      Patient ID: Sarah Camp is a 43 y.o. male. Chief Complaint   Patient presents with    Post-Op Check     TFE  Pain is better than before     HPI Pain improved post TFE.      SIJ improved hip pain, TFE alleviated back pain    Pain Assessment  Location of Pain: Back  Severity of Pain: 3  Quality of Pain: Dull, Sharp  Duration of Pain: A few days  Frequency of Pain: Intermittent  Aggravating Factors: Stairs, Walking, Standing, Squatting, Kneeling, Exercise, Straightening, Stretching, Bending  Limiting Behavior: Yes  Relieving Factors: Rest, Ice, Heat (procedures)  Result of Injury: Yes  Work-Related Injury: No    Allergies   Allergen Reactions    Advil [Ibuprofen]      Intolerance to Advil only- tolerates generic ibuprofen    Diclofenac Diarrhea and Other (See Comments)              Outpatient Medications Marked as Taking for the 10/31/22 encounter (Office Visit) with Leopold Penta, APRN - CNP   Medication Sig Dispense Refill    nortriptyline (PAMELOR) 25 MG capsule Take 25 mg by mouth      diclofenac sodium (VOLTAREN) 1 % GEL Apply 4 g topically 4 times daily as needed for Pain 100 g 2    levothyroxine (SYNTHROID) 100 MCG tablet Take 0.5 tablets by mouth daily      vitamin B-12 (CYANOCOBALAMIN) 500 MCG tablet Take 500 mcg by mouth daily      vitamin D (ERGOCALCIFEROL) 32755 UNITS CAPS capsule Take 50,000 Units by mouth once a week         Past Medical History:   Diagnosis Date    Anesthesia     trouble coming out of anesthesia and with morphine    Arthritis     Carpal tunnel syndrome, right     Diabetes mellitus (HCC)     in the past    Dizziness     GERD (gastroesophageal reflux disease)     no RX    Headache     IBS (irritable bowel syndrome)     PTSD (post-traumatic stress disorder)     [employer]     Shortness of breath     Sleep apnea     cpap    Thyroid disease     Wrist pain, right        Past Surgical History:   Procedure Laterality Date    BACK INJECTION Left 8/26/2022    Left SACROILIAC JOINT Injection performed by Dmitry Martínez MD at [address] Right 10/20/2016    CYST REMOVAL Left 2009    testicle    FOOT SURGERY  2007    PAIN MANAGEMENT PROCEDURE Left 10/6/2022    Left L4 & L5 TRANSFORAMINAL performed by Dmitry Martínez MD at [address] Right 04/2017    [facility] / Mikal Copier    TESTICLE REMOVAL Left 2010    VASECTOMY  2010       Family History   Problem Relation Age of Onset    Heart Disease Father     High Blood Pressure Father     Stroke Father     Bleeding Prob Father     Kidney Disease Father     Thyroid Disease Father     Alcohol Abuse Father     Other Father         respiratory illness    Diabetes Maternal Grandmother     Diabetes Maternal Grandfather     Breast Cancer Paternal Grandmother     Diabetes Paternal Grandmother     Diabetes Paternal Grandfather     Glaucoma Other        Social History     Socioeconomic History    Marital status:      Spouse name: None    Number of children: None    Years of education: None    Highest education level: None   Tobacco Use    Smoking status: Former     Years: 7.00     Types: Cigarettes    Smokeless tobacco: Never   Substance and Sexual Activity    Alcohol use: No    Drug use: Yes     Types: Marijuana (Weed)     Comment: medical-has not taken yet    Sexual activity: Yes     Partners: Female     Review of Systems   Constitutional:  Positive for fatigue. Respiratory:  Negative for shortness of breath. Cardiovascular:  Positive for leg swelling. Negative for chest pain. Gastrointestinal:  Positive for constipation. Genitourinary:  Negative for difficulty urinating. Musculoskeletal:  Positive for arthralgias, back pain, joint swelling and myalgias. Neurological:  Positive for weakness and numbness. Psychiatric/Behavioral:  Positive for sleep disturbance. Objective:   Physical Exam  Vitals reviewed. Constitutional:       General: He is awake.  He is not in acute distress. Appearance: Normal appearance. He is well-developed and well-groomed. He is not ill-appearing, toxic-appearing or diaphoretic. HENT:      Head: Normocephalic and atraumatic. Cardiovascular:      Rate and Rhythm: Normal rate. Pulmonary:      Effort: Pulmonary effort is normal.   Skin:     General: Skin is warm and dry. Nails: There is no clubbing. Neurological:      Mental Status: He is alert and oriented to person, place, and time. Cranial Nerves: No cranial nerve deficit. Psychiatric:         Speech: Speech normal.         Behavior: Behavior is cooperative. Assessment / Plan:          Diagnosis Orders   1. Chronic pain of left ankle        2. Lumbar spondylosis        3. Lumbar radiculitis        4. Peripheral polyneuropathy          RTC 2 months, will reevaluate pain to determine if procedures need to be rescheduled.

## 2022-12-22 ENCOUNTER — OFFICE VISIT (OUTPATIENT)
Dept: PAIN MANAGEMENT | Age: 42
End: 2022-12-22
Payer: OTHER GOVERNMENT

## 2022-12-22 VITALS
SYSTOLIC BLOOD PRESSURE: 112 MMHG | DIASTOLIC BLOOD PRESSURE: 76 MMHG | HEIGHT: 73 IN | BODY MASS INDEX: 39.49 KG/M2 | WEIGHT: 298 LBS

## 2022-12-22 DIAGNOSIS — M47.816 LUMBAR SPONDYLOSIS: ICD-10-CM

## 2022-12-22 DIAGNOSIS — M53.3 SACROILIAC PAIN: Primary | ICD-10-CM

## 2022-12-22 DIAGNOSIS — M54.16 LUMBAR RADICULITIS: ICD-10-CM

## 2022-12-22 PROCEDURE — 99214 OFFICE O/P EST MOD 30 MIN: CPT | Performed by: NURSE PRACTITIONER

## 2022-12-22 PROCEDURE — 99212 OFFICE O/P EST SF 10 MIN: CPT | Performed by: NURSE PRACTITIONER

## 2022-12-22 ASSESSMENT — ENCOUNTER SYMPTOMS
SHORTNESS OF BREATH: 0
CONSTIPATION: 1
BACK PAIN: 1

## 2022-12-22 NOTE — PROGRESS NOTES
Subjective:      Patient ID: Noreen Dandy is a 43 y.o. male. Chief Complaint   Patient presents with    Lower Back Pain     Post 3 injections- spine and hip     HPI Continues to be doing well in regards to low back pain, injections worked very well.      Pain Assessment  Location of Pain: Back  Location Modifiers:  (lower)  Severity of Pain: 4  Quality of Pain: Sharp (stabbing)  Duration of Pain: Persistent  Frequency of Pain: Intermittent  Aggravating Factors: Walking (a lot of activity)  Limiting Behavior: Yes  Relieving Factors: Rest, Exercise, Heat, Ice (stretching)  Result of Injury: Yes  Work-Related Injury: Yes  Are there other pain locations you wish to document?: No    Allergies   Allergen Reactions    Advil [Ibuprofen]      Intolerance to Advil only- tolerates generic ibuprofen    Diclofenac Diarrhea and Other (See Comments)              Outpatient Medications Marked as Taking for the 12/22/22 encounter (Office Visit) with RADHA Lagos CNP   Medication Sig Dispense Refill    nortriptyline (PAMELOR) 25 MG capsule Take 25 mg by mouth      diclofenac sodium (VOLTAREN) 1 % GEL Apply 4 g topically 4 times daily as needed for Pain 100 g 2    levothyroxine (SYNTHROID) 100 MCG tablet Take 0.5 tablets by mouth daily      vitamin B-12 (CYANOCOBALAMIN) 500 MCG tablet Take 500 mcg by mouth daily      vitamin D (ERGOCALCIFEROL) 94286 UNITS CAPS capsule Take 50,000 Units by mouth once a week         Past Medical History:   Diagnosis Date    Anesthesia     trouble coming out of anesthesia and with morphine    Arthritis     Carpal tunnel syndrome, right     Diabetes mellitus (HCC)     in the past    Dizziness     GERD (gastroesophageal reflux disease)     no RX    Headache     IBS (irritable bowel syndrome)     PTSD (post-traumatic stress disorder)     [employer]     Shortness of breath     Sleep apnea     cpap    Thyroid disease     Wrist pain, right        Past Surgical History:   Procedure Laterality Date    BACK INJECTION Left 8/26/2022    Left SACROILIAC JOINT Injection performed by Siddhartha June MD at [facility] Right 10/20/2016    CYST REMOVAL Left 2009    testicle    FOOT SURGERY  2007    PAIN MANAGEMENT PROCEDURE Left 10/6/2022    Left L4 & L5 TRANSFORAMINAL performed by Siddhartha June MD at [address] Right 04/2017    [facility] / Will[facility]    TESTICLE REMOVAL Left 2010    VASECTOMY  2010       Family History   Problem Relation Age of Onset    Heart Disease Father     High Blood Pressure Father     Stroke Father     Bleeding Prob Father     Kidney Disease Father     Thyroid Disease Father     Alcohol Abuse Father     Other Father         respiratory illness    Diabetes Maternal Grandmother     Diabetes Maternal Grandfather     Breast Cancer Paternal Grandmother     Diabetes Paternal Grandmother     Diabetes Paternal Grandfather     Glaucoma Other        Social History     Socioeconomic History    Marital status:      Spouse name: None    Number of children: None    Years of education: None    Highest education level: None   Tobacco Use    Smoking status: Former     Years: 7.00     Types: Cigarettes    Smokeless tobacco: Never   Substance and Sexual Activity    Alcohol use: No    Drug use: Yes     Types: Marijuana (Weed)     Comment: medical-has not taken yet    Sexual activity: Yes     Partners: Female     Review of Systems   Constitutional:  Positive for fatigue. Respiratory:  Negative for shortness of breath. Cardiovascular:  Positive for leg swelling. Negative for chest pain. Gastrointestinal:  Positive for constipation. Genitourinary:  Negative for difficulty urinating. Musculoskeletal:  Positive for arthralgias, back pain, joint swelling and myalgias. Neurological:  Positive for weakness and numbness. Psychiatric/Behavioral:  Positive for sleep disturbance. Objective:   Physical Exam  Vitals reviewed.    Constitutional: General: He is awake. He is not in acute distress. Appearance: Normal appearance. He is well-developed and well-groomed. He is not ill-appearing, toxic-appearing or diaphoretic. HENT:      Head: Normocephalic and atraumatic. Cardiovascular:      Rate and Rhythm: Normal rate. Pulmonary:      Effort: Pulmonary effort is normal.   Skin:     General: Skin is warm and dry. Nails: There is no clubbing. Neurological:      Mental Status: He is alert and oriented to person, place, and time. Cranial Nerves: No cranial nerve deficit. Psychiatric:         Speech: Speech normal.         Behavior: Behavior is cooperative. Assessment / Plan:          Diagnosis Orders   1. Sacroiliac pain        2. Lumbar radiculitis        3.  Lumbar spondylosis          RTC prn upon return of pain, repeat injections as needed

## 2023-03-16 ENCOUNTER — HOSPITAL ENCOUNTER (OUTPATIENT)
Dept: GENERAL RADIOLOGY | Age: 43
Discharge: HOME | End: 2023-03-18
Payer: OTHER GOVERNMENT

## 2023-03-16 ENCOUNTER — OFFICE VISIT (OUTPATIENT)
Dept: PRIMARY CARE CLINIC | Age: 43
End: 2023-03-16
Payer: OTHER GOVERNMENT

## 2023-03-16 VITALS
TEMPERATURE: 97.7 F | DIASTOLIC BLOOD PRESSURE: 88 MMHG | WEIGHT: 312.13 LBS | HEART RATE: 87 BPM | SYSTOLIC BLOOD PRESSURE: 124 MMHG | OXYGEN SATURATION: 97 % | HEIGHT: 73 IN | BODY MASS INDEX: 41.37 KG/M2 | RESPIRATION RATE: 18 BRPM

## 2023-03-16 DIAGNOSIS — G89.29 CHRONIC LEFT SHOULDER PAIN: ICD-10-CM

## 2023-03-16 DIAGNOSIS — G89.29 CHRONIC LEFT SHOULDER PAIN: Primary | ICD-10-CM

## 2023-03-16 DIAGNOSIS — M25.512 PAIN IN LEFT ACROMIOCLAVICULAR JOINT: ICD-10-CM

## 2023-03-16 DIAGNOSIS — M25.512 CHRONIC LEFT SHOULDER PAIN: ICD-10-CM

## 2023-03-16 DIAGNOSIS — M25.512 CHRONIC LEFT SHOULDER PAIN: Primary | ICD-10-CM

## 2023-03-16 PROCEDURE — 99204 OFFICE O/P NEW MOD 45 MIN: CPT | Performed by: NURSE PRACTITIONER

## 2023-03-16 PROCEDURE — 73030 X-RAY EXAM OF SHOULDER: CPT

## 2023-03-16 RX ORDER — PREDNISONE 20 MG/1
20 TABLET ORAL 2 TIMES DAILY
Qty: 10 TABLET | Refills: 0 | Status: SHIPPED | OUTPATIENT
Start: 2023-03-16 | End: 2023-03-21

## 2023-03-16 NOTE — RESULT ENCOUNTER NOTE
Please notify patient of left shoulder xray results. No new changes or recommendations at this time. Will try to get MRI scheduled and have him follow up with Orthopedics.
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
## 2023-03-29 ENCOUNTER — HOSPITAL ENCOUNTER (OUTPATIENT)
Dept: MRI IMAGING | Age: 43
Discharge: HOME | End: 2023-03-31

## 2023-03-29 ENCOUNTER — TELEPHONE (OUTPATIENT)
Dept: MRI IMAGING | Age: 43
End: 2023-03-29

## 2023-03-29 DIAGNOSIS — G89.29 CHRONIC LEFT SHOULDER PAIN: ICD-10-CM

## 2023-03-29 DIAGNOSIS — M25.512 CHRONIC LEFT SHOULDER PAIN: ICD-10-CM

## 2023-03-29 NOTE — TELEPHONE ENCOUNTER
Alexis Putnam was unable to do the MRI of his left shoulder because it was too tight on his arms. He will need to be scheduled on an Open MRI. He said he has had one done at [facility] before. Please contact the patient about getting something set up.      Thank you    Author Rupa

## 2023-03-30 NOTE — TELEPHONE ENCOUNTER
Spoke to [address] [place] [address] at [facility], she will start a new prior Auth send it to [facility] and get it scheduled

## 2023-05-08 ENCOUNTER — OFFICE VISIT (OUTPATIENT)
Dept: ORTHOPEDIC SURGERY | Age: 43
End: 2023-05-08
Payer: OTHER GOVERNMENT

## 2023-05-08 VITALS — HEIGHT: 73 IN | BODY MASS INDEX: 41.35 KG/M2 | WEIGHT: 312 LBS

## 2023-05-08 DIAGNOSIS — M19.012 ARTHRITIS OF LEFT ACROMIOCLAVICULAR JOINT: Primary | ICD-10-CM

## 2023-05-08 PROCEDURE — 99212 OFFICE O/P EST SF 10 MIN: CPT | Performed by: ORTHOPAEDIC SURGERY

## 2023-05-08 PROCEDURE — 99212 OFFICE O/P EST SF 10 MIN: CPT

## 2023-05-08 NOTE — PROGRESS NOTES
Orthopedic Office Note  [address], [address]  [facility] [address]      CHIEF COMPLAINT:    Chief Complaint   Patient presents with    Shoulder Pain     6 week follow up for left shoulder pain had injection done last visit       HISTORY OF PRESENT ILLNESS:      The patient is a 43 y.o. male  who presents today for follow-up of his chronic left shoulder pain and hand acromioclavicular joint injection. He reports with the injection getting significant pain relief. Main complaint is his low back pain which acutely increased over the weekend. He sees a chiropractor and pain management as allowed through [place] services.     Past Medical History:    Past Medical History:   Diagnosis Date    Anesthesia     trouble coming out of anesthesia and with morphine    Arthritis     Carpal tunnel syndrome, right     Diabetes mellitus ([address]     in the past    Dizziness     GERD (gastroesophageal reflux disease)     no RX    Headache     IBS (irritable bowel syndrome)     PTSD (post-traumatic stress disorder)     [employer]     Shortness of breath     Sleep apnea     cpap    Thyroid disease     Wrist pain, right        Past Surgical History:    Past Surgical History:   Procedure Laterality Date    BACK INJECTION Left 8/26/2022    Left SACROILIAC JOINT Injection performed by Latasha Toney MD at [place] [address] Right 10/20/2016    CYST REMOVAL Left 2009    testicle    FOOT SURGERY  2007    PAIN MANAGEMENT PROCEDURE Left 10/6/2022    Left L4 & L5 TRANSFORAMINAL performed by Latasha Toney MD at [address] [address] Right 04/2017    [facility] / Laurie Ramirez    TESTICLE REMOVAL Left 2010    VASECTOMY  2010       Medications Prior to Admission:   Current Outpatient Medications   Medication Sig Dispense Refill    diclofenac sodium (VOLTAREN) 1 % GEL Apply 4 g topically

## (undated) DEVICE — CHLORAPREP 26ML CLEAR

## (undated) DEVICE — NEEDLE, QUINCKE, 22GX5": Brand: MEDLINE

## (undated) DEVICE — TRAY PAIN CUST

## (undated) DEVICE — GLOVE ORANGE PI 7   MSG9070

## (undated) DEVICE — GLOVE ORANGE PI 8   MSG9080

## (undated) DEVICE — NEEDLE FLTR 18GA L1.5IN MEM THK5UM BLNT DISP

## (undated) DEVICE — GLOVE ORANGE PI 7 1/2   MSG9075

## (undated) DEVICE — GLOVE SURG SZ 8 L12IN THK91MIL BRN LTX FREE POLYCHLOROPRENE

## (undated) DEVICE — LINE SAMP O2 6.5FT W/FEMALE CONN F/ADULT CAPNOLINE PLUS

## (undated) DEVICE — SYRINGE ANGIO 3ML W/ CLR POLYCARB BRL YEL PLUNG BLK MED

## (undated) DEVICE — BANDAGE ADH DIA7/8IN NAT FLEX-FABRIC WVN FOR WND PROTCT